# Patient Record
Sex: MALE | Race: WHITE | NOT HISPANIC OR LATINO
[De-identification: names, ages, dates, MRNs, and addresses within clinical notes are randomized per-mention and may not be internally consistent; named-entity substitution may affect disease eponyms.]

---

## 2018-12-17 ENCOUNTER — RECORDS - HEALTHEAST (OUTPATIENT)
Dept: ADMINISTRATIVE | Facility: OTHER | Age: 83
End: 2018-12-17

## 2019-02-18 ENCOUNTER — AMBULATORY - HEALTHEAST (OUTPATIENT)
Dept: GERIATRICS | Facility: CLINIC | Age: 84
End: 2019-02-18

## 2019-02-18 ENCOUNTER — OFFICE VISIT - HEALTHEAST (OUTPATIENT)
Dept: GERIATRICS | Facility: CLINIC | Age: 84
End: 2019-02-18

## 2019-02-18 DIAGNOSIS — Z93.59 SUPRAPUBIC CATHETER (H): ICD-10-CM

## 2019-02-18 DIAGNOSIS — I73.9 PAD (PERIPHERAL ARTERY DISEASE) (H): ICD-10-CM

## 2019-02-18 DIAGNOSIS — R29.6 FREQUENT FALLS: ICD-10-CM

## 2019-02-18 DIAGNOSIS — R33.8 BENIGN PROSTATIC HYPERPLASIA WITH URINARY RETENTION: ICD-10-CM

## 2019-02-18 DIAGNOSIS — R53.1 GENERALIZED WEAKNESS: ICD-10-CM

## 2019-02-18 DIAGNOSIS — N40.1 BENIGN PROSTATIC HYPERPLASIA WITH URINARY RETENTION: ICD-10-CM

## 2019-02-18 RX ORDER — ACETAMINOPHEN 500 MG
1000 TABLET ORAL DAILY
Status: SHIPPED | COMMUNITY
Start: 2019-02-18

## 2019-02-18 RX ORDER — MICONAZOLE NITRATE 20 MG/G
1 CREAM TOPICAL 2 TIMES DAILY
Status: SHIPPED | COMMUNITY
Start: 2019-02-18

## 2019-02-18 RX ORDER — MULTIVIT-MIN/IRON FUM/FOLIC AC 7.5 MG-4
1 TABLET ORAL DAILY
Status: SHIPPED | COMMUNITY
Start: 2019-02-18

## 2019-02-20 ENCOUNTER — COMMUNICATION - HEALTHEAST (OUTPATIENT)
Dept: GERIATRICS | Facility: CLINIC | Age: 84
End: 2019-02-20

## 2019-02-22 ENCOUNTER — COMMUNICATION - HEALTHEAST (OUTPATIENT)
Dept: GERIATRICS | Facility: CLINIC | Age: 84
End: 2019-02-22

## 2019-02-25 ENCOUNTER — OFFICE VISIT - HEALTHEAST (OUTPATIENT)
Dept: GERIATRICS | Facility: CLINIC | Age: 84
End: 2019-02-25

## 2019-02-25 DIAGNOSIS — N40.1 BENIGN PROSTATIC HYPERPLASIA WITH URINARY RETENTION: ICD-10-CM

## 2019-02-25 DIAGNOSIS — R33.8 BENIGN PROSTATIC HYPERPLASIA WITH URINARY RETENTION: ICD-10-CM

## 2019-02-25 DIAGNOSIS — Z93.59 SUPRAPUBIC CATHETER (H): ICD-10-CM

## 2019-02-25 DIAGNOSIS — R29.6 FREQUENT FALLS: ICD-10-CM

## 2019-02-25 DIAGNOSIS — R53.1 GENERALIZED WEAKNESS: ICD-10-CM

## 2019-02-28 ENCOUNTER — OFFICE VISIT - HEALTHEAST (OUTPATIENT)
Dept: GERIATRICS | Facility: CLINIC | Age: 84
End: 2019-02-28

## 2019-02-28 DIAGNOSIS — R33.8 BENIGN PROSTATIC HYPERPLASIA WITH URINARY RETENTION: ICD-10-CM

## 2019-02-28 DIAGNOSIS — N40.1 BENIGN PROSTATIC HYPERPLASIA WITH URINARY RETENTION: ICD-10-CM

## 2019-02-28 DIAGNOSIS — I73.9 PAD (PERIPHERAL ARTERY DISEASE) (H): ICD-10-CM

## 2019-02-28 DIAGNOSIS — R53.1 GENERALIZED WEAKNESS: ICD-10-CM

## 2019-02-28 DIAGNOSIS — R62.7 FTT (FAILURE TO THRIVE) IN ADULT: ICD-10-CM

## 2019-02-28 DIAGNOSIS — Z93.59 SUPRAPUBIC CATHETER (H): ICD-10-CM

## 2019-03-04 ENCOUNTER — OFFICE VISIT - HEALTHEAST (OUTPATIENT)
Dept: GERIATRICS | Facility: CLINIC | Age: 84
End: 2019-03-04

## 2019-03-04 DIAGNOSIS — R29.6 FREQUENT FALLS: ICD-10-CM

## 2019-03-04 DIAGNOSIS — N40.1 BENIGN PROSTATIC HYPERPLASIA WITH URINARY RETENTION: ICD-10-CM

## 2019-03-04 DIAGNOSIS — Z93.59 SUPRAPUBIC CATHETER (H): ICD-10-CM

## 2019-03-04 DIAGNOSIS — R53.1 GENERALIZED WEAKNESS: ICD-10-CM

## 2019-03-04 DIAGNOSIS — R33.8 BENIGN PROSTATIC HYPERPLASIA WITH URINARY RETENTION: ICD-10-CM

## 2019-03-07 ENCOUNTER — OFFICE VISIT - HEALTHEAST (OUTPATIENT)
Dept: GERIATRICS | Facility: CLINIC | Age: 84
End: 2019-03-07

## 2019-03-07 DIAGNOSIS — N40.1 BENIGN PROSTATIC HYPERPLASIA WITH URINARY RETENTION: ICD-10-CM

## 2019-03-07 DIAGNOSIS — R53.1 GENERALIZED WEAKNESS: ICD-10-CM

## 2019-03-07 DIAGNOSIS — R62.7 FTT (FAILURE TO THRIVE) IN ADULT: ICD-10-CM

## 2019-03-07 DIAGNOSIS — Z93.59 SUPRAPUBIC CATHETER (H): ICD-10-CM

## 2019-03-07 DIAGNOSIS — R33.8 BENIGN PROSTATIC HYPERPLASIA WITH URINARY RETENTION: ICD-10-CM

## 2019-03-11 ENCOUNTER — OFFICE VISIT - HEALTHEAST (OUTPATIENT)
Dept: GERIATRICS | Facility: CLINIC | Age: 84
End: 2019-03-11

## 2019-03-11 DIAGNOSIS — Z93.59 SUPRAPUBIC CATHETER (H): ICD-10-CM

## 2019-03-11 DIAGNOSIS — R62.7 FTT (FAILURE TO THRIVE) IN ADULT: ICD-10-CM

## 2019-03-11 DIAGNOSIS — R33.8 BENIGN PROSTATIC HYPERPLASIA WITH URINARY RETENTION: ICD-10-CM

## 2019-03-11 DIAGNOSIS — N40.1 BENIGN PROSTATIC HYPERPLASIA WITH URINARY RETENTION: ICD-10-CM

## 2019-03-11 DIAGNOSIS — R53.1 GENERALIZED WEAKNESS: ICD-10-CM

## 2019-03-11 DIAGNOSIS — R29.6 FREQUENT FALLS: ICD-10-CM

## 2019-03-14 ENCOUNTER — OFFICE VISIT - HEALTHEAST (OUTPATIENT)
Dept: GERIATRICS | Facility: CLINIC | Age: 84
End: 2019-03-14

## 2019-03-14 DIAGNOSIS — R52 PAIN MANAGEMENT: ICD-10-CM

## 2019-03-14 DIAGNOSIS — Z93.59 SUPRAPUBIC CATHETER (H): ICD-10-CM

## 2019-03-14 DIAGNOSIS — R53.1 GENERALIZED WEAKNESS: ICD-10-CM

## 2019-03-14 DIAGNOSIS — N40.1 BENIGN PROSTATIC HYPERPLASIA WITH URINARY RETENTION: ICD-10-CM

## 2019-03-14 DIAGNOSIS — R33.8 BENIGN PROSTATIC HYPERPLASIA WITH URINARY RETENTION: ICD-10-CM

## 2019-03-18 ENCOUNTER — OFFICE VISIT - HEALTHEAST (OUTPATIENT)
Dept: GERIATRICS | Facility: CLINIC | Age: 84
End: 2019-03-18

## 2019-03-18 DIAGNOSIS — R33.8 BENIGN PROSTATIC HYPERPLASIA WITH URINARY RETENTION: ICD-10-CM

## 2019-03-18 DIAGNOSIS — R53.1 GENERALIZED WEAKNESS: ICD-10-CM

## 2019-03-18 DIAGNOSIS — Z93.59 SUPRAPUBIC CATHETER (H): ICD-10-CM

## 2019-03-18 DIAGNOSIS — R29.6 FREQUENT FALLS: ICD-10-CM

## 2019-03-18 DIAGNOSIS — N40.1 BENIGN PROSTATIC HYPERPLASIA WITH URINARY RETENTION: ICD-10-CM

## 2019-03-21 ENCOUNTER — OFFICE VISIT - HEALTHEAST (OUTPATIENT)
Dept: GERIATRICS | Facility: CLINIC | Age: 84
End: 2019-03-21

## 2019-03-21 DIAGNOSIS — R29.6 FREQUENT FALLS: ICD-10-CM

## 2019-03-21 DIAGNOSIS — R53.1 GENERALIZED WEAKNESS: ICD-10-CM

## 2019-03-21 DIAGNOSIS — Z93.59 SUPRAPUBIC CATHETER (H): ICD-10-CM

## 2019-03-21 DIAGNOSIS — R33.8 BENIGN PROSTATIC HYPERPLASIA WITH URINARY RETENTION: ICD-10-CM

## 2019-03-21 DIAGNOSIS — N40.1 BENIGN PROSTATIC HYPERPLASIA WITH URINARY RETENTION: ICD-10-CM

## 2019-03-25 ENCOUNTER — OFFICE VISIT - HEALTHEAST (OUTPATIENT)
Dept: GERIATRICS | Facility: CLINIC | Age: 84
End: 2019-03-25

## 2019-03-25 DIAGNOSIS — Z93.59 SUPRAPUBIC CATHETER (H): ICD-10-CM

## 2019-03-25 DIAGNOSIS — R53.1 GENERALIZED WEAKNESS: ICD-10-CM

## 2019-03-25 DIAGNOSIS — R29.6 FREQUENT FALLS: ICD-10-CM

## 2019-03-25 DIAGNOSIS — N40.1 BENIGN PROSTATIC HYPERPLASIA WITH URINARY RETENTION: ICD-10-CM

## 2019-03-25 DIAGNOSIS — R33.8 BENIGN PROSTATIC HYPERPLASIA WITH URINARY RETENTION: ICD-10-CM

## 2019-03-28 ENCOUNTER — OFFICE VISIT - HEALTHEAST (OUTPATIENT)
Dept: GERIATRICS | Facility: CLINIC | Age: 84
End: 2019-03-28

## 2019-03-28 DIAGNOSIS — Z93.59 SUPRAPUBIC CATHETER (H): ICD-10-CM

## 2019-03-28 DIAGNOSIS — R60.0 BILATERAL LOWER EXTREMITY EDEMA: ICD-10-CM

## 2019-03-28 DIAGNOSIS — R33.9 URINARY RETENTION: ICD-10-CM

## 2019-03-28 DIAGNOSIS — N40.1 BENIGN PROSTATIC HYPERPLASIA WITH URINARY RETENTION: ICD-10-CM

## 2019-03-28 DIAGNOSIS — R33.8 BENIGN PROSTATIC HYPERPLASIA WITH URINARY RETENTION: ICD-10-CM

## 2019-04-04 ENCOUNTER — OFFICE VISIT - HEALTHEAST (OUTPATIENT)
Dept: GERIATRICS | Facility: CLINIC | Age: 84
End: 2019-04-04

## 2019-04-04 DIAGNOSIS — R33.8 BENIGN PROSTATIC HYPERPLASIA WITH URINARY RETENTION: ICD-10-CM

## 2019-04-04 DIAGNOSIS — R33.9 URINARY RETENTION: ICD-10-CM

## 2019-04-04 DIAGNOSIS — I73.9 PAD (PERIPHERAL ARTERY DISEASE) (H): ICD-10-CM

## 2019-04-04 DIAGNOSIS — R53.1 GENERALIZED WEAKNESS: ICD-10-CM

## 2019-04-04 DIAGNOSIS — N40.1 BENIGN PROSTATIC HYPERPLASIA WITH URINARY RETENTION: ICD-10-CM

## 2019-04-08 ENCOUNTER — OFFICE VISIT - HEALTHEAST (OUTPATIENT)
Dept: GERIATRICS | Facility: CLINIC | Age: 84
End: 2019-04-08

## 2019-04-08 DIAGNOSIS — I73.9 PAD (PERIPHERAL ARTERY DISEASE) (H): ICD-10-CM

## 2019-04-08 DIAGNOSIS — R33.9 URINARY RETENTION: ICD-10-CM

## 2019-04-08 DIAGNOSIS — N40.1 BENIGN PROSTATIC HYPERPLASIA WITH URINARY RETENTION: ICD-10-CM

## 2019-04-08 DIAGNOSIS — R53.1 GENERALIZED WEAKNESS: ICD-10-CM

## 2019-04-08 DIAGNOSIS — R33.8 BENIGN PROSTATIC HYPERPLASIA WITH URINARY RETENTION: ICD-10-CM

## 2019-04-08 DIAGNOSIS — Z93.59 SUPRAPUBIC CATHETER (H): ICD-10-CM

## 2019-04-11 ENCOUNTER — OFFICE VISIT - HEALTHEAST (OUTPATIENT)
Dept: GERIATRICS | Facility: CLINIC | Age: 84
End: 2019-04-11

## 2019-04-11 DIAGNOSIS — R33.8 BENIGN PROSTATIC HYPERPLASIA WITH URINARY RETENTION: ICD-10-CM

## 2019-04-11 DIAGNOSIS — R33.9 URINARY RETENTION: ICD-10-CM

## 2019-04-11 DIAGNOSIS — I73.9 PAD (PERIPHERAL ARTERY DISEASE) (H): ICD-10-CM

## 2019-04-11 DIAGNOSIS — N40.1 BENIGN PROSTATIC HYPERPLASIA WITH URINARY RETENTION: ICD-10-CM

## 2019-04-11 DIAGNOSIS — Z93.59 SUPRAPUBIC CATHETER (H): ICD-10-CM

## 2019-04-11 DIAGNOSIS — R53.1 GENERALIZED WEAKNESS: ICD-10-CM

## 2019-04-11 DIAGNOSIS — R60.0 BILATERAL LOWER EXTREMITY EDEMA: ICD-10-CM

## 2019-04-11 DIAGNOSIS — R29.6 FREQUENT FALLS: ICD-10-CM

## 2019-04-15 RX ORDER — MUPIROCIN 20 MG/G
1 OINTMENT TOPICAL 3 TIMES DAILY
Status: SHIPPED | COMMUNITY
Start: 2019-04-15

## 2019-04-16 ENCOUNTER — AMBULATORY - HEALTHEAST (OUTPATIENT)
Dept: GERIATRICS | Facility: CLINIC | Age: 84
End: 2019-04-16

## 2021-05-27 NOTE — PROGRESS NOTES
[facility]    Facility:   [facility] [[id number]]   Code Status: DNR/DNI      CHIEF COMPLAINT/REASON FOR VISIT:  Chief Complaint   Patient presents with     Review Of Multiple Medical Conditions       HISTORY:      HPI: Ronnie is a 89 y.o. male who was admitted to the hospital due to frequent falls and failure to thrive.  He had progressive weakness and now required wheelchair.  He had BPH with urinary retention requiring chronic indwelling Bar catheter.  While he was hospitalized it was recommended that he have a suprapubic catheter placed since there was erosion of the penis from the indwelling Bar catheter.  This procedure was done.  It was determined that he was at risk for aspiration pneumonia and restrictions of diet were imposed.  At the same time it was determined that he is at risk for dehydration and he required IV fluids.  However he became fluid overloaded.  It was at this point that discussion of hospice consultation was done and this occurred, but there were signs of improvement of his condition with some clearing of confusion with the treatment of the UTI and appearance of some improvement of overall condition, such that his daughter wanted to try transitional care unit for strengthening and potential for long-term care placement knowing that hospice is always an option in the future.    Upon current review of systems he is doing well.  He states he is having no new medical problems.  He is not having fevers or chills.  He does not have cough or shortness of breath.  Does not have chest pain or palpitations of the heart.  He does not have abdominal pain or nausea.  He is not having problems with the suprapubic catheter.      Past Medical History:   Diagnosis Date     Arthropathy, multiple sites      Benign non-nodular prostatic hyperplasia without lower urinary tract symptoms      Chronic indwelling Bar catheter      Depressive disorder      Dysphagia       FTT (failure to thrive) in adult      Gout      Hearing loss      Onychomycosis      PAD (peripheral artery disease) (H)      Penile erosion     d/t rivera cath             Family History   Problem Relation Age of Onset     Stroke Mother      Lung cancer Sister      Stroke Brother      Brain cancer Sister      Social History     Socioeconomic History     Marital status:      Spouse name: Not on file     Number of children: Not on file     Years of education: Not on file     Highest education level: Not on file   Occupational History     Not on file   Social Needs     Financial resource strain: Not on file     Food insecurity:     Worry: Not on file     Inability: Not on file     Transportation needs:     Medical: Not on file     Non-medical: Not on file   Tobacco Use     Smoking status: Former Smoker     Packs/day: 1.00     Years: 10.00     Pack years: 10.00     Types: Cigarettes     Last attempt to quit: 5/10/1963     Years since quittin.9     Smokeless tobacco: Never Used   Substance and Sexual Activity     Alcohol use: Yes     Alcohol/week: 4.2 oz     Types: 7 Cans of beer per week     Comment: 1 beer per day     Drug use: Not on file     Sexual activity: Not on file   Lifestyle     Physical activity:     Days per week: Not on file     Minutes per session: Not on file     Stress: Not on file   Relationships     Social connections:     Talks on phone: Not on file     Gets together: Not on file     Attends [employer] service: Not on file     Active member of club or organization: Not on file     Attends meetings of clubs or organizations: Not on file     Relationship status: Not on file     Intimate partner violence:     Fear of current or ex partner: Not on file     Emotionally abused: Not on file     Physically abused: Not on file     Forced sexual activity: Not on file   Other Topics Concern     Not on file   Social History Narrative     Not on file         Review of Systems    .There were no vitals  filed for this visit.    Physical Exam   Constitutional: No distress.   HENT:   Nose: Nose normal.   Eyes: Right eye exhibits no discharge. Left eye exhibits no discharge.   Neck: No JVD present.   Cardiovascular: Normal heart sounds.   Pulmonary/Chest: Breath sounds normal. No respiratory distress.   Abdominal: Soft. Bowel sounds are normal. He exhibits no distension. There is no tenderness.   Neurological: He is alert.   Psychiatric: He has a normal mood and affect.   Nursing note and vitals reviewed.        LABS:   No new laboratory testing    ASSESSMENT:      ICD-10-CM    1. Benign prostatic hyperplasia with urinary retention N40.1     R33.8    2. Urinary retention R33.9    3. Generalized weakness R53.1    4. PAD (peripheral artery disease) (H) I73.9    5. Suprapubic catheter (H) Z93.59        PLAN:    Continue monitoring medical conditions.  Continue with therapies as able.    Electronically signed by: Bayron Hall MD

## 2021-05-27 NOTE — PROGRESS NOTES
[facility]    NAME:  Ronnie Stein             :  1924  MRN: [id number]  CODE STATUS:  DNR/DNI    VISIT TYPE: DISCHARGE SUMMARY  FACILYTY: [facility] [[id number]]                    PRIMARY CARE PROVIDER: Matthieu Finley MD    DISCHARGE DIAGNOSIS:      1. Frequent falls    2. Generalized weakness    3. Urinary retention    4. Bilateral lower extremity edema    5. PAD (peripheral artery disease) (H)    6. Benign prostatic hyperplasia with urinary retention    7. Suprapubic catheter (H)         DISCHARGE MEDICATIONS:      Current Outpatient Medications   Medication Sig Dispense Refill     acetaminophen (TYLENOL) 500 MG tablet Take 1,000 mg by mouth daily. 1h prior to dressing changes, and two times a day prn at least 4h between doses             miconazole (MICOTIN) 2 % cream Apply 1 application topically 2 (two) times a day. Apply to penile erosion       multivitamin with minerals tablet Take 1 tablet by mouth daily.       mupirocin (BACTROBAN) 2 % ointment Apply 1 application topically 3 (three) times a day.       No current facility-administered medications for this visit.        HISTORY OF PRESENT ILLNESS: Ronnie Stein is a 89 y.o. male with Peripheral artery disease and Chronic indwelling rivera catheter, history of recurrent falls, wheelchair bound since admission in 2018 who was admitted on 2019 for failure to thrive, dehydration/malnutrition and recurrent falls. He presented to ER for evaluation following a fall and dizziness. No LOC.     On admission, he was noted to have multiple wounds feet, elbow and failure to thrive with prealbumin 7. Speech therapist following, concerned for aspiration. Now on dysphagia diet which puts him at higher risk of dehydration. He was treated with IVF on admission for dehydration but developed hypoxemia due to volume overload. IVF stopped. Dr. Tam discussed with family that hospice might be a better option and  hospice consulted. Hospice Liaison met with patient and daughter on 2/12 and felt he was not hospice appropriate at the time. Patient at high risk for aspiration and also at very high risk for dehydration and acute kidney injury given poor oral intake. He continues to have poor oral intake and losing weight since admission. Yesterday(2/14), Hospice Liaison reviewed patient's chart and they now agree that he is appropriate for hospice. Discussed with patient's daughter 2/14 who thinks patient is clinically doing little better and his confusion resolved. She would like to continue current management for now and is not ready for hospice or comfort care at this time. Patient had been little confused early this week which was likely due to UTI. Currently on antibiotic for UTI, his confusion much improved and daughter would like to see if he can get stronger again. She would like to pursue TCU or long term placement at this time but knows that hospice is an option if he fails or worsening.  Repeat CXR 2/14/19 showed improved finding. He continues to require some supplemental oxygen mostly with sleeping.       Patient was also seen by Urology for rivera related penile erosion, recommended suprapubic tube placement which was placed on 2/12/19 by IR. He was noted to have E. Coli UTI now on oral antibiotic.       ASSESSMENT and PLAN      Principal Problem:  Recurrent Falls  Failure to thrive   Dysphagia     Patient with history of recurrent falls, admitted with fall and dizziness. Suspected etiology include dehydration, malnutrition.     Continue therapies.     Continue dysphagia diet per speech therapies. Aspiration precautions    High risk for dehydration and acute kidney injury.     Ensure supplement (he does not like the taste)    Discussed with nursing to assist patient at all times with feeding. Please keep recording his oral intake and encourage PO intake    Wound care following for multiple wounds    Hospice liaison  met with patient and daughter 2/12/2019 - felt not hospice appropriate at the time. Now with poor oral intake and weight loss since then, he is hospice appropriate per hospice liaison. Discussed with daughter yesterday (2/14/19) who is not ready for hospice or comfort care at this time. But she understands that it is an option if he fails and/or they change their mind.   -Consider palliative care consult     Penile erosion     Chronic indwelling Rivera catheter  E. 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 Will need to be changed every 4 wks    Continue Keflex to complete the course.   Active Problems:  Cough, hypoxemia: fluid overload on CXR 2/12. Also at high risk for aspiration    IVF stopped 2/12 given CXR findings with pulmonary venous congestion. He was weaned off oxygen, but now back on 2L oxygen overnight 2/13-2/14.     Obtain CXR today.     Oxygen as needed.     Aspiration precautions    Bronchodilator as needed.     If worsened, consider CT chest for further evaluation   Benign non-nodular prostatic hyperplasia without lower urinary tract symptoms    On Flomax    Urology following, appreciated    ACP (advance care planning)    DNR.     Consider hospice as outpatient  Patient was stabilized and transferred to TCU for continued rehabilitation.    SKILLED NURSING FACILITY COURSE:  During this TCU stay, patient completed all anticipated goals of therapy.  He continues to have generalized weakness and will discharge to an [facility] with PT/OT.  He has BPH with urinary retention and has a suprapubic catheter.  He is followed by Urology. Pain is managed with Tylenol    PHYSICAL EXAMINATION:    Vitals:    04/15/19 2251   BP: 95/53   Pulse: 83   Resp: 16   Temp: 97.1  F (36.2  C)   SpO2: 90%   Weight: 148 lb 12.8 oz (67.5  kg)       GENERAL: Awake, Alert, oriented x3, not in any form of acute distress, answers questions appropriately, follows simple commands, conversant  HEENT: Head is normocephalic with normal hair distribution. No evidence of trauma. Ears: No acute purulent discharge. Eyes: Conjunctivae pink with no scleral jaundice. Nose: Normal mucosa and septum. NECK: Supple with no cervical or supraclavicular lymphadenopathy. Trachea is midline.   CHEST: No tenderness or deformity, no crepitus  LUNG: Clear to auscultation with good chest expansion. There are no crackles or wheezes, normal AP diameter.  BACK: No kyphosis of the thoracic spine. Symmetric, no curvature, ROM normal, no CVA tenderness, no spinal tenderness   CVS: There is good S1  S2, there are no murmurs, rubs, gallops, or heaves, rhythm is regular,  2+ pulses symmetric in all extremities.  ABDOMEN: Globular and soft, nontender to palpation, non distended, no masses, no organomegaly, good bowel sounds, no rebound or guarding, no peritoneal signs.   EXTREMITIES: Decreased  range of motion on both upper and lower extremities, there is no tenderness to palpation, no pedal edema, no cyanosis or clubbing, no calf tenderness.  Pulses equal in all extremities, normal cap refill, no joint swelling.  SKIN: Warm and dry, no erythema noted.  Skin color, texture, no rashes or lesions.  NEUROLOGICAL: The patient is oriented to person, place and time. Strength and sensation are grossly intact. Face is symmetric.    LABS:  All labs reviewed in the nursing home record.    DISCHARGE PLAN:  I certify that this patient is under my care and that I or the nurse practitioner working with me, had a face-to-face encounter that meets the physician face-to-face encounter requirements with this patient.   Date of Face-to-Face Encounter: 4/11/19    This patient is homebound because: The patient cannot leave home without  considerable and taxing effort.  Patient has FTT, generalized weakness,  and Urinary retention and requires the aid of a wheelchair and needs the assistance of another person,     I certify that, based on my findings, the following services are medically necessary home health services: PT, OT, and RN    My clinical findings support the need for the above skilled services because:   Patient to be followed by home care for physical therapy to eval and treat for strengthening, balance, endurance, and safety with mobility, and ambulation.  Patient to be followed by home care for occupational therapy to eval and treat for strengthening, ADL needs, adaptive equipment, and safety.  Patient to be followed by home care for nursing services for medication set up and teaching, symptom and disease processes monitoring and education.      The patient is, or has been, under my care and I have initiated the establishment of the plan of care. This patient will be followed by a physician who will periodically review the plan of care.  Planned discharge.  All therapy goals have been met.  Family will assist with discharge and transportation.  Patient will follow up with PCP within 7-10 days after discharge for medication mangagment and appropriate lab studies.          Electronically signed by:  John Oneil CNP      For documentation purposes, chart review, medication management, and discharge coordination of care was greater than 35 minutes

## 2021-05-27 NOTE — PROGRESS NOTES
[facility]    NAME:  Ronnie Stein             :  1924  MRN: [id number]  CODE STATUS:  DNR/DNI    FACILITY:  [facility] [[id number]]       ROOM:   814    CHIEF COMPLAINT/REASON FOR VISIT:  Chief Complaint   Patient presents with     Problem Visit     Generalized weakness     HISTORY OF PRESENT ILLNESS: Ronnie Stein is a 89 y.o. male with Peripheral artery disease and Chronic indwelling rivera catheter, history of recurrent falls, wheelchair bound since admission in 2018 who was admitted on 2019 for failure to thrive, dehydration/malnutrition and recurrent falls. He presented to ER for evaluation following a fall and dizziness. No LOC.     On admission, he was noted to have multiple wounds feet, elbow and failure to thrive with prealbumin 7. Speech therapist following, concerned for aspiration. Now on dysphagia diet which puts him at higher risk of dehydration. He was treated with IVF on admission for dehydration but developed hypoxemia due to volume overload. IVF stopped. Dr. Tam discussed with family that hospice might be a better option and hospice consulted. Hospice Liaison met with patient and daughter on  and felt he was not hospice appropriate at the time. Patient at high risk for aspiration and also at very high risk for dehydration and acute kidney injury given poor oral intake. He continues to have poor oral intake and losing weight since admission. Yesterday(), Hospice Liaison reviewed patient's chart and they now agree that he is appropriate for hospice. Discussed with patient's daughter  who thinks patient is clinically doing little better and his confusion resolved. She would like to continue current management for now and is not ready for hospice or comfort care at this time. Patient had been little confused early this week which was likely due to UTI. Currently on antibiotic for UTI, his confusion much improved and daughter  would like to see if he can get stronger again. She would like to pursue TCU or long term placement at this time but knows that hospice is an option if he fails or worsening.  Repeat CXR 2/14/19 showed improved finding. He continues to require some supplemental oxygen mostly with sleeping.       Patient was also seen by Urology for rivera related penile erosion, recommended suprapubic tube placement which was placed on 2/12/19 by IR. He was noted to have E. Coli UTI now on oral antibiotic.       ASSESSMENT and PLAN      Principal Problem:  Recurrent Falls  Failure to thrive   Dysphagia     Patient with history of recurrent falls, admitted with fall and dizziness. Suspected etiology include dehydration, malnutrition.     Continue therapies.     Continue dysphagia diet per speech therapies. Aspiration precautions    High risk for dehydration and acute kidney injury.     Ensure supplement (he does not like the taste)    Discussed with nursing to assist patient at all times with feeding. Please keep recording his oral intake and encourage PO intake    Wound care following for multiple wounds    Hospice liaison met with patient and daughter 2/12/2019 - felt not hospice appropriate at the time. Now with poor oral intake and weight loss since then, he is hospice appropriate per hospice liaison. Discussed with daughter yesterday (2/14/19) who is not ready for hospice or comfort care at this time. But she understands that it is an option if he fails and/or they change their mind.   -Consider palliative care consult     Penile erosion     Chronic indwelling Rivera catheter  E. 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 Will need to be changed every 4 wks    Continue Keflex to complete the course.   Active Problems:  Cough,  hypoxemia: fluid overload on CXR 2/12. Also at high risk for aspiration    IVF stopped 2/12 given CXR findings with pulmonary venous congestion. He was weaned off oxygen, but now back on 2L oxygen overnight 2/13-2/14.     Obtain CXR today.     Oxygen as needed.     Aspiration precautions    Bronchodilator as needed.     If worsened, consider CT chest for further evaluation   Benign non-nodular prostatic hyperplasia without lower urinary tract symptoms    On Flomax    Urology following, appreciated    ACP (advance care planning)    DNR.     Consider hospice as outpatient  Patient was stabilized and transferred to TCU for continued rehabilitation.    Today, patient is seen at the bedside.  He has bilateral lower extremity edema.  He has urinary retention with a suprapubic catheter. Followed by Urology.        Past Medical History:   Diagnosis Date     Arthropathy, multiple sites      Benign non-nodular prostatic hyperplasia without lower urinary tract symptoms      Chronic indwelling Rivera catheter      Depressive disorder      Dysphagia      FTT (failure to thrive) in adult      Gout      Hearing loss      Onychomycosis      PAD (peripheral artery disease) (H)      Penile erosion     d/t rivera cath     Past Surgical History:   Procedure Laterality Date     CERVICAL LAMINECTOMY       LUMBAR LAMINECTOMY       [place] REMOVE LENS MATERIAL PHACOFRAGMT  6/1/06, 6/15/06     Family History   Problem Relation Age of Onset     Stroke Mother      Lung cancer Sister      Stroke Brother      Brain cancer Sister      Social History     Socioeconomic History     Marital status:      Spouse name: Not on file     Number of children: Not on file     Years of education: Not on file     Highest education level: Not on file   Occupational History     Not on file   Social Needs     Financial resource strain: Not on file     Food insecurity:     Worry: Not on file     Inability: Not on file     Transportation needs:     Medical: Not on  file     Non-medical: Not on file   Tobacco Use     Smoking status: Former Smoker     Packs/day: 1.00     Years: 10.00     Pack years: 10.00     Types: Cigarettes     Last attempt to quit: 5/10/1963     Years since quittin.9     Smokeless tobacco: Never Used   Substance and Sexual Activity     Alcohol use: Yes     Alcohol/week: 4.2 oz     Types: 7 Cans of beer per week     Comment: 1 beer per day     Drug use: Not on file     Sexual activity: Not on file   Lifestyle     Physical activity:     Days per week: Not on file     Minutes per session: Not on file     Stress: Not on file   Relationships     Social connections:     Talks on phone: Not on file     Gets together: Not on file     Attends Hoahaoism service: Not on file     Active member of club or organization: Not on file     Attends meetings of clubs or organizations: Not on file     Relationship status: Not on file     Intimate partner violence:     Fear of current or ex partner: Not on file     Emotionally abused: Not on file     Physically abused: Not on file     Forced sexual activity: Not on file   Other Topics Concern     Not on file   Social History Narrative     Not on file     No Known Allergies     Current Outpatient Medications   Medication Sig Dispense Refill     acetaminophen (TYLENOL) 500 MG tablet Take 1,000 mg by mouth daily. 1h prior to dressing changes, and two times a day prn at least 4h between doses             miconazole (MICOTIN) 2 % cream Apply 1 application topically 2 (two) times a day. Apply to penile erosion       multivitamin with minerals tablet Take 1 tablet by mouth daily.       tamsulosin (FLOMAX) 0.4 mg cap Take 0.4 mg by mouth Daily after breakfast.       No current facility-administered medications for this visit.      REVIEW OF SYSTEMS:    Currently, no fever, chills, or rigors. Does not have any visual or hearing problems. Denies any chest pain, headaches, palpitations, lightheadedness, dizziness, shortness of breath,  or cough. Appetite is good. Denies any GERD symptoms. Denies any difficulty with swallowing, nausea, or vomiting.  Denies any abdominal pain, diarrhea or constipation. Urinary retention with suprapubic catheter. No insomnia. No active bleeding. No rash.     PHYSICAL EXAMINATION:  Vitals:    04/08/19 2247   BP: 110/52   Pulse: 89   Resp: 17   Temp: (!) 96.2  F (35.7  C)   SpO2: 95%   Weight: 143 lb 12.8 oz (65.2 kg)       GENERAL: Awake, Alert, oriented x3, not in any form of acute distress, answers questions appropriately, follows simple commands, conversant  HEENT: Head is normocephalic with normal hair distribution. No evidence of trauma. Ears: No acute purulent discharge. Eyes: Conjunctivae pink with no scleral jaundice. Nose: Normal mucosa and septum. NECK: Supple with no cervical or supraclavicular lymphadenopathy. Trachea is midline.   CHEST: No tenderness or deformity, no crepitus  LUNG: Clear to auscultation with good chest expansion. There are no crackles or wheezes, normal AP diameter.  BACK: No kyphosis of the thoracic spine. Symmetric, no curvature, ROM normal, no CVA tenderness, no spinal tenderness   CVS: There is good S1  S2, there are no murmurs, rubs, gallops, or heaves, rhythm is regular,  2+ pulses symmetric in all extremities.  ABDOMEN: Globular and soft, nontender to palpation, non distended, no masses, no organomegaly, good bowel sounds, no rebound or guarding, no peritoneal signs.   EXTREMITIES:  Decreased range of motion on both upper and lower extremities, there is no tenderness to palpation, bilateral lower extremity edema, no cyanosis or clubbing, no calf tenderness.  Pulses equal in all extremities, normal cap refill, no joint swelling.  SKIN: Warm and dry, no erythema noted.  Skin color, texture, no rashes or lesions.  NEUROLOGICAL: The patient is oriented to person, place and time. Strength and sensation are grossly intact. Face is symmetric.    LABS:  All labs reviewed in the nursing  home record.    ASSESSMENT/PLAN:    1. Generalized weakness - Continue PT/OT   2. Benign prostatic hyperplasia with urinary retention  - Continue Flomax   3. PAD (peripheral artery disease) (H) - Stable   4. Urinary retention - Continue Flomax and suprapubic catheter.  Followed by Urology                             Electronically signed by:  John Oneil CNP

## 2021-05-27 NOTE — PROGRESS NOTES
[facility]    NAME:  Ronnie Stein             :  1924  MRN: [id number]  CODE STATUS:  DNR/DNI    FACILITY:  [facility] [[id number]]       ROOM:   814    CHIEF COMPLAINT/REASON FOR VISIT:  Chief Complaint   Patient presents with     Problem Visit     Bilateral lower extremity edema and Urinary retention     HISTORY OF PRESENT ILLNESS: Ronnie Stein is a 89 y.o. male with Peripheral artery disease and Chronic indwelling rivera catheter, history of recurrent falls, wheelchair bound since admission in 2018 who was admitted on 2019 for failure to thrive, dehydration/malnutrition and recurrent falls. He presented to ER for evaluation following a fall and dizziness. No LOC.     On admission, he was noted to have multiple wounds feet, elbow and failure to thrive with prealbumin 7. Speech therapist following, concerned for aspiration. Now on dysphagia diet which puts him at higher risk of dehydration. He was treated with IVF on admission for dehydration but developed hypoxemia due to volume overload. IVF stopped. Dr. Tam discussed with family that hospice might be a better option and hospice consulted. Hospice Liaison met with patient and daughter on  and felt he was not hospice appropriate at the time. Patient at high risk for aspiration and also at very high risk for dehydration and acute kidney injury given poor oral intake. He continues to have poor oral intake and losing weight since admission. Yesterday(), Hospice Liaison reviewed patient's chart and they now agree that he is appropriate for hospice. Discussed with patient's daughter  who thinks patient is clinically doing little better and his confusion resolved. She would like to continue current management for now and is not ready for hospice or comfort care at this time. Patient had been little confused early this week which was likely due to UTI. Currently on antibiotic for UTI, his  confusion much improved and daughter would like to see if he can get stronger again. She would like to pursue TCU or long term placement at this time but knows that hospice is an option if he fails or worsening.  Repeat CXR 2/14/19 showed improved finding. He continues to require some supplemental oxygen mostly with sleeping.       Patient was also seen by Urology for rivera related penile erosion, recommended suprapubic tube placement which was placed on 2/12/19 by IR. He was noted to have E. Coli UTI now on oral antibiotic.       ASSESSMENT and PLAN      Principal Problem:  Recurrent Falls  Failure to thrive   Dysphagia     Patient with history of recurrent falls, admitted with fall and dizziness. Suspected etiology include dehydration, malnutrition.     Continue therapies.     Continue dysphagia diet per speech therapies. Aspiration precautions    High risk for dehydration and acute kidney injury.     Ensure supplement (he does not like the taste)    Discussed with nursing to assist patient at all times with feeding. Please keep recording his oral intake and encourage PO intake    Wound care following for multiple wounds    Hospice liaison met with patient and daughter 2/12/2019 - felt not hospice appropriate at the time. Now with poor oral intake and weight loss since then, he is hospice appropriate per hospice liaison. Discussed with daughter yesterday (2/14/19) who is not ready for hospice or comfort care at this time. But she understands that it is an option if he fails and/or they change their mind.   -Consider palliative care consult     Penile erosion     Chronic indwelling Rivera catheter  E. 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 Will need to be changed every 4 wks    Continue Keflex to complete the  course.   Active Problems:  Cough, hypoxemia: fluid overload on CXR 2/12. Also at high risk for aspiration    IVF stopped 2/12 given CXR findings with pulmonary venous congestion. He was weaned off oxygen, but now back on 2L oxygen overnight 2/13-2/14.     Obtain CXR today.     Oxygen as needed.     Aspiration precautions    Bronchodilator as needed.     If worsened, consider CT chest for further evaluation   Benign non-nodular prostatic hyperplasia without lower urinary tract symptoms    On Flomax    Urology following, appreciated    ACP (advance care planning)    DNR.     Consider hospice as outpatient  Patient was stabilized and transferred to TCU for continued rehabilitation.    Today, patient is seen at the bedside.  He has bilateral lower extremity edema.  He has urinary retention with a suprapubic catheter. Followed by Urology.        Past Medical History:   Diagnosis Date     Arthropathy, multiple sites      Benign non-nodular prostatic hyperplasia without lower urinary tract symptoms      Chronic indwelling Rivera catheter      Depressive disorder      Dysphagia      FTT (failure to thrive) in adult      Gout      Hearing loss      Onychomycosis      PAD (peripheral artery disease) (H)      Penile erosion     d/t rivera cath     Past Surgical History:   Procedure Laterality Date     CERVICAL LAMINECTOMY       LUMBAR LAMINECTOMY       [place] REMOVE LENS MATERIAL PHACOFRAGMT  6/1/06, 6/15/06     Family History   Problem Relation Age of Onset     Stroke Mother      Lung cancer Sister      Stroke Brother      Brain cancer Sister      Social History     Socioeconomic History     Marital status:      Spouse name: Not on file     Number of children: Not on file     Years of education: Not on file     Highest education level: Not on file   Occupational History     Not on file   Social Needs     Financial resource strain: Not on file     Food insecurity:     Worry: Not on file     Inability: Not on file      Transportation needs:     Medical: Not on file     Non-medical: Not on file   Tobacco Use     Smoking status: Former Smoker     Packs/day: 1.00     Years: 10.00     Pack years: 10.00     Types: Cigarettes     Last attempt to quit: 5/10/1963     Years since quittin.9     Smokeless tobacco: Never Used   Substance and Sexual Activity     Alcohol use: Yes     Alcohol/week: 4.2 oz     Types: 7 Cans of beer per week     Comment: 1 beer per day     Drug use: Not on file     Sexual activity: Not on file   Lifestyle     Physical activity:     Days per week: Not on file     Minutes per session: Not on file     Stress: Not on file   Relationships     Social connections:     Talks on phone: Not on file     Gets together: Not on file     Attends Congregation service: Not on file     Active member of club or organization: Not on file     Attends meetings of clubs or organizations: Not on file     Relationship status: Not on file     Intimate partner violence:     Fear of current or ex partner: Not on file     Emotionally abused: Not on file     Physically abused: Not on file     Forced sexual activity: Not on file   Other Topics Concern     Not on file   Social History Narrative     Not on file     No Known Allergies     Current Outpatient Medications   Medication Sig Dispense Refill     acetaminophen (TYLENOL) 500 MG tablet Take 1,000 mg by mouth daily. 1h prior to dressing changes, and two times a day prn at least 4h between doses             miconazole (MICOTIN) 2 % cream Apply 1 application topically 2 (two) times a day. Apply to penile erosion       multivitamin with minerals tablet Take 1 tablet by mouth daily.       tamsulosin (FLOMAX) 0.4 mg cap Take 0.4 mg by mouth Daily after breakfast.       No current facility-administered medications for this visit.      REVIEW OF SYSTEMS:    Currently, no fever, chills, or rigors. Does not have any visual or hearing problems. Denies any chest pain, headaches, palpitations,  lightheadedness, dizziness, shortness of breath, or cough. Appetite is good. Denies any GERD symptoms. Denies any difficulty with swallowing, nausea, or vomiting.  Denies any abdominal pain, diarrhea or constipation. Urinary retention with suprapubic catheter. No insomnia. No active bleeding. No rash.     PHYSICAL EXAMINATION:  Vitals:    03/28/19 0953   BP: (!) 82/47   Pulse: 81   Resp: 16   Temp: 97.1  F (36.2  C)   SpO2: 95%   Weight: 145 lb 9.6 oz (66 kg)       GENERAL: Awake, Alert, oriented x3, not in any form of acute distress, answers questions appropriately, follows simple commands, conversant  HEENT: Head is normocephalic with normal hair distribution. No evidence of trauma. Ears: No acute purulent discharge. Eyes: Conjunctivae pink with no scleral jaundice. Nose: Normal mucosa and septum. NECK: Supple with no cervical or supraclavicular lymphadenopathy. Trachea is midline.   CHEST: No tenderness or deformity, no crepitus  LUNG: Clear to auscultation with good chest expansion. There are no crackles or wheezes, normal AP diameter.  BACK: No kyphosis of the thoracic spine. Symmetric, no curvature, ROM normal, no CVA tenderness, no spinal tenderness   CVS: There is good S1  S2, there are no murmurs, rubs, gallops, or heaves, rhythm is regular,  2+ pulses symmetric in all extremities.  ABDOMEN: Globular and soft, nontender to palpation, non distended, no masses, no organomegaly, good bowel sounds, no rebound or guarding, no peritoneal signs.   EXTREMITIES:  Decreased range of motion on both upper and lower extremities, there is no tenderness to palpation, bilateral lower extremity edema, no cyanosis or clubbing, no calf tenderness.  Pulses equal in all extremities, normal cap refill, no joint swelling.  SKIN: Warm and dry, no erythema noted.  Skin color, texture, no rashes or lesions.  NEUROLOGICAL: The patient is oriented to person, place and time. Strength and sensation are grossly intact. Face is  symmetric.    LABS:  All labs reviewed in the nursing home record.    ASSESSMENT/PLAN:    1. Urinary retention - Continue Flomax and suprapubic catheter.  Followed by Urology   2. Bilateral lower extremity edema - Worsening, will start ROSEMARY hose - on in morning and off on evening   3. Suprapubic catheter (H) - See above   4. Benign prostatic hyperplasia with urinary retention - Continue Flomax                               Electronically signed by:  John Oneil CNP

## 2021-05-27 NOTE — PROGRESS NOTES
[facility]    Facility:   [facility] [[id number]]   Code Status: DNR/DNI      CHIEF COMPLAINT/REASON FOR VISIT:  Chief Complaint   Patient presents with     Review Of Multiple Medical Conditions       HISTORY:        HPI: Ronnie is a 89 y.o. male who was admitted to the hospital due to frequent falls and failure to thrive.  He had progressive weakness and now required wheelchair.  He had BPH with urinary retention requiring chronic indwelling Bar catheter.  While he was hospitalized it was recommended that he have a suprapubic catheter placed since there was erosion of the penis from the indwelling Bar catheter.  This procedure was done.  It was determined that he was at risk for aspiration pneumonia and restrictions of diet were imposed.  At the same time it was determined that he is at risk for dehydration and he required IV fluids.  However he became fluid overloaded.  It was at this point that discussion of hospice consultation was done and this occurred, but there were signs of improvement of his condition with some clearing of confusion with the treatment of the UTI and appearance of some improvement of overall condition, such that his daughter wanted to try transitional care unit for strengthening and potential for long-term care placement knowing that hospice is always an option in the future.    Upon current review of systems he is doing well.  He states he has no new health problem.  Specifically he does not have fevers or chills.  He does not have cough or shortness of breath.  He does not have chest pain or palpitations of the heart.  He does not have abdominal pain or nausea.  Per assessment he requires nectar thick liquids.    Past Medical History:   Diagnosis Date     Arthropathy, multiple sites      Benign non-nodular prostatic hyperplasia without lower urinary tract symptoms      Chronic indwelling Bar catheter      Depressive disorder      Dysphagia       FTT (failure to thrive) in adult      Gout      Hearing loss      Onychomycosis      PAD (peripheral artery disease) (H)      Penile erosion     d/t rivera cath             Family History   Problem Relation Age of Onset     Stroke Mother      Lung cancer Sister      Stroke Brother      Brain cancer Sister      Social History     Socioeconomic History     Marital status:      Spouse name: Not on file     Number of children: Not on file     Years of education: Not on file     Highest education level: Not on file   Occupational History     Not on file   Social Needs     Financial resource strain: Not on file     Food insecurity:     Worry: Not on file     Inability: Not on file     Transportation needs:     Medical: Not on file     Non-medical: Not on file   Tobacco Use     Smoking status: Former Smoker     Packs/day: 1.00     Years: 10.00     Pack years: 10.00     Types: Cigarettes     Last attempt to quit: 5/10/1963     Years since quittin.9     Smokeless tobacco: Never Used   Substance and Sexual Activity     Alcohol use: Yes     Alcohol/week: 4.2 oz     Types: 7 Cans of beer per week     Comment: 1 beer per day     Drug use: Not on file     Sexual activity: Not on file   Lifestyle     Physical activity:     Days per week: Not on file     Minutes per session: Not on file     Stress: Not on file   Relationships     Social connections:     Talks on phone: Not on file     Gets together: Not on file     Attends Latter-day service: Not on file     Active member of club or organization: Not on file     Attends meetings of clubs or organizations: Not on file     Relationship status: Not on file     Intimate partner violence:     Fear of current or ex partner: Not on file     Emotionally abused: Not on file     Physically abused: Not on file     Forced sexual activity: Not on file   Other Topics Concern     Not on file   Social History Narrative     Not on file         Review of Systems    .  Vitals:    19  1650   BP: 105/54   Pulse: 80   Resp: 17   Temp: (!) 96.3  F (35.7  C)   SpO2: 98%       Physical Exam   Constitutional: No distress.   HENT:   Nose: Nose normal.   Eyes: Right eye exhibits no discharge. Left eye exhibits no discharge.   Neck: No JVD present.   Cardiovascular: Normal heart sounds.   Pulmonary/Chest: Breath sounds normal. No respiratory distress.   Musculoskeletal: He exhibits no edema.   Neurological: He is alert.   Psychiatric: He has a normal mood and affect.   Nursing note and vitals reviewed.        LABS:   No new laboratory testing    ASSESSMENT:      ICD-10-CM    1. Benign prostatic hyperplasia with urinary retention N40.1     R33.8    2. Suprapubic catheter (H) Z93.59    3. Generalized weakness R53.1    4. Frequent falls R29.6        PLAN:    Continue current plans      Electronically signed by: Bayron Hall MD

## 2021-05-27 NOTE — PROGRESS NOTES
[facility]    Facility:   [facility] [[id number]]   Code Status: DNR/DNI      CHIEF COMPLAINT/REASON FOR VISIT:  Chief Complaint   Patient presents with     Review Of Multiple Medical Conditions       HISTORY:      HPI: Ronnie is a 89 y.o. male who was admitted to the hospital due to frequent falls and failure to thrive.  He had progressive weakness and now required wheelchair.  He had BPH with urinary retention requiring chronic indwelling Bar catheter.  While he was hospitalized it was recommended that he have a suprapubic catheter placed since there was erosion of the penis from the indwelling Bar catheter.  This procedure was done.  It was determined that he was at risk for aspiration pneumonia and restrictions of diet were imposed.  At the same time it was determined that he is at risk for dehydration and he required IV fluids.  However he became fluid overloaded.  It was at this point that discussion of hospice consultation was done and this occurred, but there were signs of improvement of his condition with some clearing of confusion with the treatment of the UTI and appearance of some improvement of overall condition, such that his daughter wanted to try transitional care unit for strengthening and potential for long-term care placement knowing that hospice is always an option in the future.    Upon current review of systems he feels that things are going well.  He is not having any new problems such as fevers or chills or cough or shortness of breath or chest pain or palpitations of the heart or abdominal pain or nausea.    Past Medical History:   Diagnosis Date     Arthropathy, multiple sites      Benign non-nodular prostatic hyperplasia without lower urinary tract symptoms      Chronic indwelling Bar catheter      Depressive disorder      Dysphagia      FTT (failure to thrive) in adult      Gout      Hearing loss      Onychomycosis      PAD (peripheral artery  disease) (H)      Penile erosion     d/t rivera cath             Family History   Problem Relation Age of Onset     Stroke Mother      Lung cancer Sister      Stroke Brother      Brain cancer Sister      Social History     Socioeconomic History     Marital status:      Spouse name: Not on file     Number of children: Not on file     Years of education: Not on file     Highest education level: Not on file   Occupational History     Not on file   Social Needs     Financial resource strain: Not on file     Food insecurity:     Worry: Not on file     Inability: Not on file     Transportation needs:     Medical: Not on file     Non-medical: Not on file   Tobacco Use     Smoking status: Former Smoker     Packs/day: 1.00     Years: 10.00     Pack years: 10.00     Types: Cigarettes     Last attempt to quit: 5/10/1963     Years since quittin.9     Smokeless tobacco: Never Used   Substance and Sexual Activity     Alcohol use: Yes     Alcohol/week: 4.2 oz     Types: 7 Cans of beer per week     Comment: 1 beer per day     Drug use: Not on file     Sexual activity: Not on file   Lifestyle     Physical activity:     Days per week: Not on file     Minutes per session: Not on file     Stress: Not on file   Relationships     Social connections:     Talks on phone: Not on file     Gets together: Not on file     Attends Taoist service: Not on file     Active member of club or organization: Not on file     Attends meetings of clubs or organizations: Not on file     Relationship status: Not on file     Intimate partner violence:     Fear of current or ex partner: Not on file     Emotionally abused: Not on file     Physically abused: Not on file     Forced sexual activity: Not on file   Other Topics Concern     Not on file   Social History Narrative     Not on file         Review of Systems    .  Vitals:    19 2049   BP: 120/64   Pulse: 77   Resp: 18   Temp: 98.1  F (36.7  C)   SpO2: 90%       Physical Exam    Constitutional: No distress.   HENT:   Nose: Nose normal.   Eyes: Right eye exhibits no discharge. Left eye exhibits no discharge.   Neck: No JVD present.   Cardiovascular: Normal heart sounds.   Pulmonary/Chest: Breath sounds normal. No respiratory distress.   Abdominal: Bowel sounds are normal. There is no tenderness.   Musculoskeletal: He exhibits no edema.   Neurological: He is alert.   Psychiatric: He has a normal mood and affect.   Nursing note and vitals reviewed.        LABS:   No new laboratory testing.    ASSESSMENT:      ICD-10-CM    1. Generalized weakness R53.1    2. Frequent falls R29.6    3. Benign prostatic hyperplasia with urinary retention N40.1     R33.8    4. Suprapubic catheter (H) Z93.59        PLAN:    Continue with current plans.      Electronically signed by: Bayron Hall MD

## 2021-06-02 VITALS — WEIGHT: 141 LBS

## 2021-06-02 VITALS — WEIGHT: 141.3 LBS

## 2021-06-02 VITALS — WEIGHT: 139.2 LBS | WEIGHT: 144 LBS

## 2021-06-02 VITALS — WEIGHT: 145.6 LBS

## 2021-06-02 VITALS — WEIGHT: 143.8 LBS

## 2021-06-02 VITALS — WEIGHT: 148.8 LBS

## 2021-06-18 NOTE — LETTER
Letter by Bayron Hall MD at      Author: Bayron Hall MD Service: -- Author Type: --    Filed:  Encounter Date: 2/25/2019 Status: (Other)         Patient: Ronnie Stein   MR Number: [id number]   YOB: 1924   Date of Visit: 2/25/2019     [facility]    Facility:   [facility] [[id number]]   Code Status: DNR/DNI      CHIEF COMPLAINT/REASON FOR VISIT:  Chief Complaint   Patient presents with   ? Review Of Multiple Medical Conditions       HISTORY:      HPI: Ronnie is a 89 y.o. male who was admitted to the hospital due to frequent falls and failure to thrive.  He had progressive weakness and now required wheelchair.  He had BPH with urinary retention requiring chronic indwelling Bar catheter.  While he was hospitalized it was recommended that he have a suprapubic catheter placed since there was erosion of the penis from the indwelling Bar catheter.  This procedure was done.  It was determined that he was at risk for aspiration pneumonia and restrictions of diet were imposed.  At the same time it was determined that he is at risk for dehydration and he required IV fluids.  However he became fluid overloaded.  It was at this point that discussion of hospice consultation was done and this occurred, but there were signs of improvement of his condition with some clearing of confusion with the treatment of the UTI and appearance of some improvement of overall condition, such that his daughter wanted to try transitional care unit for strengthening and potential for long-term care placement knowing that hospice is always an option in the future.    Upon current review of systems, he is feeling better.  He is not having fevers or chills.  He is not having cough or shortness of breath.  He is not having chest pain or palpitations of the heart.  He is not having abdominal pain or nausea.    Past Medical History:   Diagnosis Date   ? Arthropathy, multiple sites    ?  Benign non-nodular prostatic hyperplasia without lower urinary tract symptoms    ? Chronic indwelling Rivera catheter    ? Depressive disorder    ? Dysphagia    ? FTT (failure to thrive) in adult    ? Gout    ? Hearing loss    ? Onychomycosis    ? PAD (peripheral artery disease) (H)    ? Penile erosion     d/t rivera cath             Family History   Problem Relation Age of Onset   ? Stroke Mother    ? Lung cancer Sister    ? Stroke Brother    ? Brain cancer Sister      Social History     Socioeconomic History   ? Marital status:      Spouse name: Not on file   ? Number of children: Not on file   ? Years of education: Not on file   ? Highest education level: Not on file   Occupational History   ? Not on file   Social Needs   ? Financial resource strain: Not on file   ? Food insecurity:     Worry: Not on file     Inability: Not on file   ? Transportation needs:     Medical: Not on file     Non-medical: Not on file   Tobacco Use   ? Smoking status: Former Smoker     Packs/day: 1.00     Years: 10.00     Pack years: 10.00     Types: Cigarettes     Last attempt to quit: 5/10/1963     Years since quittin.8   ? Smokeless tobacco: Never Used   Substance and Sexual Activity   ? Alcohol use: Yes     Alcohol/week: 4.2 oz     Types: 7 Cans of beer per week     Comment: 1 beer per day   ? Drug use: Not on file   ? Sexual activity: Not on file   Lifestyle   ? Physical activity:     Days per week: Not on file     Minutes per session: Not on file   ? Stress: Not on file   Relationships   ? Social connections:     Talks on phone: Not on file     Gets together: Not on file     Attends Tenriism service: Not on file     Active member of club or organization: Not on file     Attends meetings of clubs or organizations: Not on file     Relationship status: Not on file   ? Intimate partner violence:     Fear of current or ex partner: Not on file     Emotionally abused: Not on file     Physically abused: Not on file     Forced  sexual activity: Not on file   Other Topics Concern   ? Not on file   Social History Narrative   ? Not on file         Review of Systems    .  Vitals:    02/25/19 1007   BP: 130/58   Pulse: 78   Resp: 19   Temp: 97.7  F (36.5  C)   SpO2: 98%       Physical Exam   Constitutional: No distress.   HENT:   Nose: Nose normal.   Eyes: Right eye exhibits no discharge. Left eye exhibits no discharge.   Neck: No JVD present.   Cardiovascular: Normal heart sounds.   Pulmonary/Chest: Breath sounds normal. No respiratory distress.   Abdominal: Soft. Bowel sounds are normal. He exhibits no distension. There is no tenderness.   Neurological: He is alert.   Conversant   Psychiatric: He has a normal mood and affect.   Nursing note and vitals reviewed.        LABS:   Urinalysis and urine culture were negative.    ASSESSMENT:      ICD-10-CM    1. Frequent falls R29.6    2. Benign prostatic hyperplasia with urinary retention N40.1     R33.8    3. Suprapubic catheter (H) Z93.59    4. Generalized weakness R53.1        PLAN:    Continue with current efforts of strengthening and evaluation of ability for ADLs.  Continue monitoring medical conditions.      Electronically signed by: Bayron Hall MD

## 2021-06-18 NOTE — LETTER
Letter by John Oneil CNP at      Author: John Oneil CNP Service: -- Author Type: --    Filed:  Encounter Date: 2019 Status: (Other)         Patient: Ronnie Stein   MR Number: [id number]   YOB: 1924   Date of Visit: 2019          [facility]    NAME:  Ronnie Stein             :  1924  MRN: [id number]  CODE STATUS:  DNR/DNI    FACILITY:  [facility] [[id number]]       ROOM:   814    CHIEF COMPLAINT/REASON FOR VISIT:  Chief Complaint   Patient presents with   ? Problem Visit     Urinary retention, recurrent falls, Dysphagia     HISTORY OF PRESENT ILLNESS: Ronnie Stein is a 89 y.o. male with Peripheral artery disease and Chronic indwelling rivera catheter, history of recurrent falls, wheelchair bound since admission in 2018 who was admitted on 2019 for failure to thrive, dehydration/malnutrition and recurrent falls. He presented to ER for evaluation following a fall and dizziness. No LOC.     On admission, he was noted to have multiple wounds feet, elbow and failure to thrive with prealbumin 7. Speech therapist following, concerned for aspiration. Now on dysphagia diet which puts him at higher risk of dehydration. He was treated with IVF on admission for dehydration but developed hypoxemia due to volume overload. IVF stopped. Dr. Tam discussed with family that hospice might be a better option and hospice consulted. Hospice Liaison met with patient and daughter on  and felt he was not hospice appropriate at the time. Patient at high risk for aspiration and also at very high risk for dehydration and acute kidney injury given poor oral intake. He continues to have poor oral intake and losing weight since admission. Yesterday(), Hospice Liaison reviewed patient's chart and they now agree that he is appropriate for hospice. Discussed with patient's daughter  who thinks patient is  clinically doing little better and his confusion resolved. She would like to continue current management for now and is not ready for hospice or comfort care at this time. Patient had been little confused early this week which was likely due to UTI. Currently on antibiotic for UTI, his confusion much improved and daughter would like to see if he can get stronger again. She would like to pursue TCU or long term placement at this time but knows that hospice is an option if he fails or worsening.  Repeat CXR 2/14/19 showed improved finding. He continues to require some supplemental oxygen mostly with sleeping.       Patient was also seen by Urology for rivera related penile erosion, recommended suprapubic tube placement which was placed on 2/12/19 by IR. He was noted to have E. Coli UTI now on oral antibiotic.       ASSESSMENT and PLAN      Principal Problem:  Recurrent Falls  Failure to thrive   Dysphagia     Patient with history of recurrent falls, admitted with fall and dizziness. Suspected etiology include dehydration, malnutrition.     Continue therapies.     Continue dysphagia diet per speech therapies. Aspiration precautions    High risk for dehydration and acute kidney injury.     Ensure supplement (he does not like the taste)    Discussed with nursing to assist patient at all times with feeding. Please keep recording his oral intake and encourage PO intake    Wound care following for multiple wounds    Hospice liaison met with patient and daughter 2/12/2019 - felt not hospice appropriate at the time. Now with poor oral intake and weight loss since then, he is hospice appropriate per hospice liaison. Discussed with daughter yesterday (2/14/19) who is not ready for hospice or comfort care at this time. But she understands that it is an option if he fails and/or they change their mind.   -Consider palliative care consult     Penile erosion     Chronic indwelling Rivera catheter  E. 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 Will need to be changed every 4 wks    Continue Keflex to complete the course.   Active Problems:  Cough, hypoxemia: fluid overload on CXR 2/12. Also at high risk for aspiration    IVF stopped 2/12 given CXR findings with pulmonary venous congestion. He was weaned off oxygen, but now back on 2L oxygen overnight 2/13-2/14.     Obtain CXR today.     Oxygen as needed.     Aspiration precautions    Bronchodilator as needed.     If worsened, consider CT chest for further evaluation   Benign non-nodular prostatic hyperplasia without lower urinary tract symptoms    On Flomax    Urology following, appreciated    ACP (advance care planning)    DNR.     Consider hospice as outpatient  Patient was stabilized and transferred to TCU for continued rehabilitation.    Today, patient is seen at the bedside.  He continues to work with ST for dysphagia.  He continues to have generalized weakness and is working with therapies.  He has not had any recent falls although he has a h/o recurrent falls..      Past Medical History:   Diagnosis Date   ? Arthropathy, multiple sites    ? Benign non-nodular prostatic hyperplasia without lower urinary tract symptoms    ? Chronic indwelling Rivera catheter    ? Depressive disorder    ? Dysphagia    ? FTT (failure to thrive) in adult    ? Gout    ? Hearing loss    ? Onychomycosis    ? PAD (peripheral artery disease) (H)    ? Penile erosion     d/t rivera cath     Past Surgical History:   Procedure Laterality Date   ? CERVICAL LAMINECTOMY     ? LUMBAR LAMINECTOMY     ? DE REMOVE LENS MATERIAL PHACOFRAGMT  6/1/06, 6/15/06     Family History   Problem Relation Age of Onset   ? Stroke Mother    ? Lung cancer Sister    ? Stroke Brother    ? Brain cancer Sister      Social History     Socioeconomic  History   ? Marital status:      Spouse name: Not on file   ? Number of children: Not on file   ? Years of education: Not on file   ? Highest education level: Not on file   Occupational History   ? Not on file   Social Needs   ? Financial resource strain: Not on file   ? Food insecurity:     Worry: Not on file     Inability: Not on file   ? Transportation needs:     Medical: Not on file     Non-medical: Not on file   Tobacco Use   ? Smoking status: Former Smoker     Packs/day: 1.00     Years: 10.00     Pack years: 10.00     Types: Cigarettes     Last attempt to quit: 5/10/1963     Years since quittin.8   ? Smokeless tobacco: Never Used   Substance and Sexual Activity   ? Alcohol use: Yes     Alcohol/week: 4.2 oz     Types: 7 Cans of beer per week     Comment: 1 beer per day   ? Drug use: Not on file   ? Sexual activity: Not on file   Lifestyle   ? Physical activity:     Days per week: Not on file     Minutes per session: Not on file   ? Stress: Not on file   Relationships   ? Social connections:     Talks on phone: Not on file     Gets together: Not on file     Attends Mosque service: Not on file     Active member of club or organization: Not on file     Attends meetings of clubs or organizations: Not on file     Relationship status: Not on file   ? Intimate partner violence:     Fear of current or ex partner: Not on file     Emotionally abused: Not on file     Physically abused: Not on file     Forced sexual activity: Not on file   Other Topics Concern   ? Not on file   Social History Narrative   ? Not on file     No Known Allergies     Current Outpatient Medications   Medication Sig Dispense Refill   ? acetaminophen (TYLENOL) 500 MG tablet Take 1,000 mg by mouth daily. 1h prior to dressing changes, and two times a day prn at least 4h between doses           ? miconazole (MICOTIN) 2 % cream Apply 1 application topically 2 (two) times a day. Apply to penile erosion     ? multivitamin with minerals  tablet Take 1 tablet by mouth daily.     ? tamsulosin (FLOMAX) 0.4 mg cap Take 0.4 mg by mouth Daily after breakfast.       No current facility-administered medications for this visit.      REVIEW OF SYSTEMS:    Currently, no fever, chills, or rigors. Does not have any visual or hearing problems. Denies any chest pain, headaches, palpitations, lightheadedness, dizziness, shortness of breath, or cough. Appetite is good. Denies any GERD symptoms. Denies any difficulty with swallowing, nausea, or vomiting.  Denies any abdominal pain, diarrhea or constipation. Urinary retention with suprapubic catheter. No insomnia. No active bleeding. No rash.     PHYSICAL EXAMINATION:  Vitals:    03/10/19 1311   BP: 120/62   Pulse: 86   Resp: 17   Temp: 96.6  F (35.9  C)   SpO2: 90%   Weight: 144 lb (65.3 kg)       GENERAL: Awake, Alert, oriented x3, not in any form of acute distress, answers questions appropriately, follows simple commands, conversant  HEENT: Head is normocephalic with normal hair distribution. No evidence of trauma. Ears: No acute purulent discharge. Eyes: Conjunctivae pink with no scleral jaundice. Nose: Normal mucosa and septum. NECK: Supple with no cervical or supraclavicular lymphadenopathy. Trachea is midline.   CHEST: No tenderness or deformity, no crepitus  LUNG: Clear to auscultation with good chest expansion. There are no crackles or wheezes, normal AP diameter.  BACK: No kyphosis of the thoracic spine. Symmetric, no curvature, ROM normal, no CVA tenderness, no spinal tenderness   CVS: There is good S1  S2, there are no murmurs, rubs, gallops, or heaves, rhythm is regular,  2+ pulses symmetric in all extremities.  ABDOMEN: Globular and soft, nontender to palpation, non distended, no masses, no organomegaly, good bowel sounds, no rebound or guarding, no peritoneal signs.   EXTREMITIES:  Full range of motion on both upper and lower extremities, there is no tenderness to palpation, no pedal edema, no cyanosis  or clubbing, no calf tenderness.  Pulses equal in all extremities, normal cap refill, no joint swelling.  SKIN: Warm and dry, no erythema noted.  Skin color, texture, no rashes or lesions.  NEUROLOGICAL: The patient is oriented to person, place and time. Strength and sensation are grossly intact. Face is symmetric.    LABS:  All labs reviewed in the nursing home record.    ASSESSMENT/PLAN:    1. Benign prostatic hyperplasia with urinary retention - s/p suprapubic catheter and continue Flomax   2. Suprapubic catheter (H) - due to penile erosion   3. Generalized weakness - Continue PT/OT   4. PAD (peripheral artery disease) (H) - Stable   5. FTT (failure to thrive) in adult - Consider Hospice as outpatient due to poor oral intake and weight loss        Electronically signed by:  John Oneil CNP    35 minutes TT of which 50% was spent in counseling and coordination of care of the above plan.    Time spent in interview and examination of patient, review of available records, and discussion with nursing staff. Continue care plan, efforts at therapy, and monitor nutritional status.

## 2021-06-18 NOTE — LETTER
Letter by John Oneil CNP at      Author: John Oneil CNP Service: -- Author Type: --    Filed:  Encounter Date: 3/7/2019 Status: (Other)         Patient: Ronnie Stein   MR Number: [id number]   YOB: 1924   Date of Visit: 3/7/2019          [facility]    NAME:  Ronnie Stein             :  1924  MRN: [id number]  CODE STATUS:  DNR/DNI    FACILITY:  [facility] [[id number]]       ROOM:   814    CHIEF COMPLAINT/REASON FOR VISIT:  Chief Complaint   Patient presents with   ? Problem Visit     HISTORY OF PRESENT ILLNESS: Ronnie Stein is a 89 y.o. male with Peripheral artery disease and Chronic indwelling rivera catheter, history of recurrent falls, wheelchair bound since admission in 2018 who was admitted on 2019 for failure to thrive, dehydration/malnutrition and recurrent falls. He presented to ER for evaluation following a fall and dizziness. No LOC.     On admission, he was noted to have multiple wounds feet, elbow and failure to thrive with prealbumin 7. Speech therapist following, concerned for aspiration. Now on dysphagia diet which puts him at higher risk of dehydration. He was treated with IVF on admission for dehydration but developed hypoxemia due to volume overload. IVF stopped. Dr. Tam discussed with family that hospice might be a better option and hospice consulted. Hospice Liaison met with patient and daughter on  and felt he was not hospice appropriate at the time. Patient at high risk for aspiration and also at very high risk for dehydration and acute kidney injury given poor oral intake. He continues to have poor oral intake and losing weight since admission. Yesterday(), Hospice Liaison reviewed patient's chart and they now agree that he is appropriate for hospice. Discussed with patient's daughter  who thinks patient is clinically doing little better and his confusion  resolved. She would like to continue current management for now and is not ready for hospice or comfort care at this time. Patient had been little confused early this week which was likely due to UTI. Currently on antibiotic for UTI, his confusion much improved and daughter would like to see if he can get stronger again. She would like to pursue TCU or long term placement at this time but knows that hospice is an option if he fails or worsening.  Repeat CXR 2/14/19 showed improved finding. He continues to require some supplemental oxygen mostly with sleeping.       Patient was also seen by Urology for rivera related penile erosion, recommended suprapubic tube placement which was placed on 2/12/19 by IR. He was noted to have E. Coli UTI now on oral antibiotic.       ASSESSMENT and PLAN      Principal Problem:  Recurrent Falls  Failure to thrive   Dysphagia     Patient with history of recurrent falls, admitted with fall and dizziness. Suspected etiology include dehydration, malnutrition.     Continue therapies.     Continue dysphagia diet per speech therapies. Aspiration precautions    High risk for dehydration and acute kidney injury.     Ensure supplement (he does not like the taste)    Discussed with nursing to assist patient at all times with feeding. Please keep recording his oral intake and encourage PO intake    Wound care following for multiple wounds    Hospice liaison met with patient and daughter 2/12/2019 - felt not hospice appropriate at the time. Now with poor oral intake and weight loss since then, he is hospice appropriate per hospice liaison. Discussed with daughter yesterday (2/14/19) who is not ready for hospice or comfort care at this time. But she understands that it is an option if he fails and/or they change their mind.   -Consider palliative care consult     Penile erosion     Chronic indwelling Rivera catheter  E. 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 Will need to be changed every 4 wks    Continue Keflex to complete the course.   Active Problems:  Cough, hypoxemia: fluid overload on CXR 2/12. Also at high risk for aspiration    IVF stopped 2/12 given CXR findings with pulmonary venous congestion. He was weaned off oxygen, but now back on 2L oxygen overnight 2/13-2/14.     Obtain CXR today.     Oxygen as needed.     Aspiration precautions    Bronchodilator as needed.     If worsened, consider CT chest for further evaluation   Benign non-nodular prostatic hyperplasia without lower urinary tract symptoms    On Flomax    Urology following, appreciated    ACP (advance care planning)    DNR.     Consider hospice as outpatient  Patient was stabilized and transferred to TCU for continued rehabilitation.    Today, patient is seen at the bedside.  His appetite is poor and he has some weight loss.  He continues to work with therapies.      Past Medical History:   Diagnosis Date   ? Arthropathy, multiple sites    ? Benign non-nodular prostatic hyperplasia without lower urinary tract symptoms    ? Chronic indwelling Rivera catheter    ? Depressive disorder    ? Dysphagia    ? FTT (failure to thrive) in adult    ? Gout    ? Hearing loss    ? Onychomycosis    ? PAD (peripheral artery disease) (H)    ? Penile erosion     d/t rivera cath     Past Surgical History:   Procedure Laterality Date   ? CERVICAL LAMINECTOMY     ? LUMBAR LAMINECTOMY     ? NE REMOVE LENS MATERIAL PHACOFRAGMT  6/1/06, 6/15/06     Family History   Problem Relation Age of Onset   ? Stroke Mother    ? Lung cancer Sister    ? Stroke Brother    ? Brain cancer Sister      Social History     Socioeconomic History   ? Marital status:      Spouse name: Not on file   ? Number of children: Not on file   ? Years of education: Not on file   ?  Highest education level: Not on file   Occupational History   ? Not on file   Social Needs   ? Financial resource strain: Not on file   ? Food insecurity:     Worry: Not on file     Inability: Not on file   ? Transportation needs:     Medical: Not on file     Non-medical: Not on file   Tobacco Use   ? Smoking status: Former Smoker     Packs/day: 1.00     Years: 10.00     Pack years: 10.00     Types: Cigarettes     Last attempt to quit: 5/10/1963     Years since quittin.8   ? Smokeless tobacco: Never Used   Substance and Sexual Activity   ? Alcohol use: Yes     Alcohol/week: 4.2 oz     Types: 7 Cans of beer per week     Comment: 1 beer per day   ? Drug use: Not on file   ? Sexual activity: Not on file   Lifestyle   ? Physical activity:     Days per week: Not on file     Minutes per session: Not on file   ? Stress: Not on file   Relationships   ? Social connections:     Talks on phone: Not on file     Gets together: Not on file     Attends Hoahaoism service: Not on file     Active member of club or organization: Not on file     Attends meetings of clubs or organizations: Not on file     Relationship status: Not on file   ? Intimate partner violence:     Fear of current or ex partner: Not on file     Emotionally abused: Not on file     Physically abused: Not on file     Forced sexual activity: Not on file   Other Topics Concern   ? Not on file   Social History Narrative   ? Not on file     No Known Allergies     Current Outpatient Medications   Medication Sig Dispense Refill   ? acetaminophen (TYLENOL) 500 MG tablet Take 1,000 mg by mouth daily. 1h prior to dressing changes, and two times a day prn at least 4h between doses           ? miconazole (MICOTIN) 2 % cream Apply 1 application topically 2 (two) times a day. Apply to penile erosion     ? multivitamin with minerals tablet Take 1 tablet by mouth daily.     ? tamsulosin (FLOMAX) 0.4 mg cap Take 0.4 mg by mouth Daily after breakfast.       No current  facility-administered medications for this visit.      REVIEW OF SYSTEMS:    Currently, no fever, chills, or rigors. Does not have any visual or hearing problems. Denies any chest pain, headaches, palpitations, lightheadedness, dizziness, shortness of breath, or cough. Appetite is good. Denies any GERD symptoms. Denies any difficulty with swallowing, nausea, or vomiting.  Denies any abdominal pain, diarrhea or constipation. Urinary retention with suprapubic catheter. No insomnia. No active bleeding. No rash.     PHYSICAL EXAMINATION:  Vitals:    03/12/19 0049   BP: 117/66   Pulse: 79   Resp: 17   Temp: (!) 96.4  F (35.8  C)   SpO2: 90%   Weight: 141 lb (64 kg)       GENERAL: Awake, Alert, oriented x3, not in any form of acute distress, answers questions appropriately, follows simple commands, conversant  HEENT: Head is normocephalic with normal hair distribution. No evidence of trauma. Ears: No acute purulent discharge. Eyes: Conjunctivae pink with no scleral jaundice. Nose: Normal mucosa and septum. NECK: Supple with no cervical or supraclavicular lymphadenopathy. Trachea is midline.   CHEST: No tenderness or deformity, no crepitus  LUNG: Clear to auscultation with good chest expansion. There are no crackles or wheezes, normal AP diameter.  BACK: No kyphosis of the thoracic spine. Symmetric, no curvature, ROM normal, no CVA tenderness, no spinal tenderness   CVS: There is good S1  S2, there are no murmurs, rubs, gallops, or heaves, rhythm is regular,  2+ pulses symmetric in all extremities.  ABDOMEN: Globular and soft, nontender to palpation, non distended, no masses, no organomegaly, good bowel sounds, no rebound or guarding, no peritoneal signs.   EXTREMITIES:  Full range of motion on both upper and lower extremities, there is no tenderness to palpation, no pedal edema, no cyanosis or clubbing, no calf tenderness.  Pulses equal in all extremities, normal cap refill, no joint swelling.  SKIN: Warm and dry, no  erythema noted.  Skin color, texture, no rashes or lesions.  NEUROLOGICAL: The patient is oriented to person, place and time. Strength and sensation are grossly intact. Face is symmetric.    LABS:  All labs reviewed in the nursing home record.    ASSESSMENT/PLAN:    1. Generalized weakness - Continue PT/OT   2. FTT (failure to thrive) in adult - Progressing, continues to have poor oral intake and weight loss    3. Benign prostatic hyperplasia with urinary retention - Continue Flomax   4. Suprapubic catheter (H) - due penile erosion and urinary retention              Electronically signed by:  John Oneil CNP

## 2021-06-18 NOTE — LETTER
Letter by Bayron Hall MD at      Author: Bayron Hall MD Service: -- Author Type: --    Filed:  Encounter Date: 2/18/2019 Status: (Other)         Patient: Ronnie Stein   MR Number: [id number]   YOB: 1924   Date of Visit: 2/18/2019     [facility]      Facility:    [facility] [[id number]]  Code Status: DNR      Chief Complaint/Reason for Visit:  Chief Complaint   Patient presents with   ? H & P       HPI:   Ronnie is a 89 y.o. male who was admitted to the hospital due to frequent falls and failure to thrive.  He had progressive weakness and now required wheelchair.  He had BPH with urinary retention requiring chronic indwelling Bar catheter.  While he was hospitalized it was recommended that he have a suprapubic catheter placed since there was erosion of the penis from the indwelling Bar catheter.  This procedure was done.  It was determined that he was at risk for aspiration pneumonia and restrictions of diet were imposed.  At the same time it was determined that he is at risk for dehydration and he required IV fluids.  However he became fluid overloaded.  It was at this point that discussion of hospice consultation was done and this occurred, but there were signs of improvement of his condition with some clearing of confusion with the treatment of the UTI and appearance of some improvement of overall condition, such that his daughter wanted to try transitional care unit for strengthening and potential for long-term care placement knowing that hospice is always an option in the future.    Upon current review of systems his daughter has concern about potential foreign body since this had been raised by a staff member.  It was for this reason that I was called to the room before I had a chance to review the chart, while nursing and myself were involved in assessing the situation.  There is a desire for water rather than having all liquids thickened  out of concern about dehydration.  He is not able to respond meaningfully at the time that I am evaluating, so by review of systems is through his daughter.  There has not been a problem with fevers or chills.  There has not been complaint of cough or shortness of breath or chest pain or palpitations of the heart or abdominal pain or nausea.  She does have concerns about UTI and how to manage this and how to prevent this from occurring in the future.  There is also concerned about management of skin ulceration around the ankle.    Past Medical History:  Past Medical History:   Diagnosis Date   ? Arthropathy, multiple sites    ? Benign non-nodular prostatic hyperplasia without lower urinary tract symptoms    ? Chronic indwelling Rivera catheter    ? Depressive disorder    ? Dysphagia    ? FTT (failure to thrive) in adult    ? Gout    ? Hearing loss    ? Onychomycosis    ? PAD (peripheral artery disease) (H)    ? Penile erosion     d/t rivera cath           Surgical History:  Past Surgical History:   Procedure Laterality Date   ? CERVICAL LAMINECTOMY     ? LUMBAR LAMINECTOMY     ? ID REMOVE LENS MATERIAL PHACOFRAGMT  6/1/06, 6/15/06       Family History:   Family History   Problem Relation Age of Onset   ? Stroke Mother    ? Lung cancer Sister    ? Stroke Brother    ? Brain cancer Sister        Social History:    Social History     Socioeconomic History   ? Marital status:      Spouse name: Not on file   ? Number of children: Not on file   ? Years of education: Not on file   ? Highest education level: Not on file   Occupational History   ? Not on file   Social Needs   ? Financial resource strain: Not on file   ? Food insecurity:     Worry: Not on file     Inability: Not on file   ? Transportation needs:     Medical: Not on file     Non-medical: Not on file   Tobacco Use   ? Smoking status: Former Smoker     Packs/day: 1.00     Years: 10.00     Pack years: 10.00     Types: Cigarettes     Last attempt to quit:  5/10/1963     Years since quittin.8   ? Smokeless tobacco: Never Used   Substance and Sexual Activity   ? Alcohol use: Yes     Alcohol/week: 4.2 oz     Types: 7 Cans of beer per week     Comment: 1 beer per day   ? Drug use: Not on file   ? Sexual activity: Not on file   Lifestyle   ? Physical activity:     Days per week: Not on file     Minutes per session: Not on file   ? Stress: Not on file   Relationships   ? Social connections:     Talks on phone: Not on file     Gets together: Not on file     Attends Mosque service: Not on file     Active member of club or organization: Not on file     Attends meetings of clubs or organizations: Not on file     Relationship status: Not on file   ? Intimate partner violence:     Fear of current or ex partner: Not on file     Emotionally abused: Not on file     Physically abused: Not on file     Forced sexual activity: Not on file   Other Topics Concern   ? Not on file   Social History Narrative   ? Not on file          Review of Systems   All other systems reviewed and are negative.      There were no vitals filed for this visit.    Physical Exam   Constitutional: No distress.   He appears cachectic and dehydrated   HENT:   Dry mucous membranes   Eyes: Right eye exhibits no discharge. Left eye exhibits no discharge.   Neck: No JVD present. No thyromegaly present.   Cardiovascular: Normal heart sounds.   Pulmonary/Chest: Breath sounds normal. No respiratory distress.   Abdominal: Soft. Bowel sounds are normal. He exhibits no distension. There is no tenderness.   Suprapubic catheter present   Musculoskeletal: He exhibits no edema or tenderness.   Lymphadenopathy:     He has no cervical adenopathy.   Neurological:   He is not very responsive in regards to his dehydrated state   Skin:   Ulceration of skin by the ankle area without surrounding warmth or redness and there is no purulent drainage.  Discussion with nursing was done at the time as to continued therapies to this  area as is currently being done.  No sign of infection at this point.  This is full-thickness ulceration but not extending into deeper tissues.   Psychiatric:   Difficult to assess due to his neurologic state.   Nursing note and vitals reviewed.      Medication List:  Current Outpatient Medications   Medication Sig   ? acetaminophen (TYLENOL) 500 MG tablet Take 1,000 mg by mouth daily. 1h prior to dressing changes, and two times a day prn at least 4h between doses         ? miconazole (MICOTIN) 2 % cream Apply 1 application topically 2 (two) times a day. Apply to penile erosion   ? multivitamin with minerals tablet Take 1 tablet by mouth daily.   ? tamsulosin (FLOMAX) 0.4 mg cap Take 0.4 mg by mouth Daily after breakfast.       Labs:  No new laboratory testing    Assessment:     ICD-10-CM    1. Benign prostatic hyperplasia with urinary retention N40.1     R33.8    2. Frequent falls R29.6    3. Generalized weakness R53.1    4. Suprapubic catheter (H) Z93.59    5. PAD (peripheral artery disease) (H) I73.9        Plan:  I had extensive discussion with his daughter both while in the room during the time of history and physical but also separately to discuss the current situation.  I explained to her that I was not aware of his underlying medical conditions when I entered the room and that he had the appearance of someone who was near death by my first impression.  After discovering all of the situations for him I explained to her that I see no evidence for foreign body, but rather erosion of the undersurface of the penis secondary to the indwelling Bar catheter previously.  At this point I explained there is no sign of infection and that this will heal by itself without any intervention of suturing or other type of procedure and since he now has an indwelling Bar catheter, it is not of concern if this area heals with scar tissue.  I explained in regards to any Bar catheter, that the presence of that catheter  increases his risk for urinary tract infection, but the alternative in his situation would be obstructive uropathy with resultant kidney failure.  I also discussed risks versus benefits regarding dehydration versus aspiration pneumonia, and she is in agreement to allow him to drink water without thickening, but to continue with the other dietary restrictions for all other intake including the thickened liquids.  I encouraged her to be ready for a time when his health will take a turn for the worse again and the value of hospice care to manage comfort cares since that would be the top priority at that time and she is in agreement.      In regards to other management issues at this time I explained that Flomax can be discontinued since he does have the suprapubic catheter.  I am willing to check a urinalysis and urine culture 1 day after completion of antibiotics, but I discussed the concerns about antibiotic resistance that can develop if there is continuous treatment for asymptomatic bacteriuria.  I agreed to having a flat plate x-ray of the abdomen looking for a foreign body in the bladder since I explained to her that there is no way to determine that other than cystoscopy or an x-ray, but that if there is evidence for foreign body, I am not sure that any intervention is required, but if that is so desired, consultation would be needed.        Electronically signed by: Bayron Hall MD

## 2021-06-18 NOTE — LETTER
Letter by Bayron Hall MD at      Author: Bayron Hall MD Service: -- Author Type: --    Filed:  Encounter Date: 3/4/2019 Status: (Other)         Patient: Ronnie Stein   MR Number: [id number]   YOB: 1924   Date of Visit: 3/4/2019     [facility]    Facility:   [facility] [[id number]]   Code Status: DNR/DNI      CHIEF COMPLAINT/REASON FOR VISIT:  Chief Complaint   Patient presents with   ? Review Of Multiple Medical Conditions       HISTORY:      HPI: Ronnie is a 89 y.o. male who was admitted to the hospital due to frequent falls and failure to thrive.  He had progressive weakness and now required wheelchair.  He had BPH with urinary retention requiring chronic indwelling Bar catheter.  While he was hospitalized it was recommended that he have a suprapubic catheter placed since there was erosion of the penis from the indwelling Bar catheter.  This procedure was done.  It was determined that he was at risk for aspiration pneumonia and restrictions of diet were imposed.  At the same time it was determined that he is at risk for dehydration and he required IV fluids.  However he became fluid overloaded.  It was at this point that discussion of hospice consultation was done and this occurred, but there were signs of improvement of his condition with some clearing of confusion with the treatment of the UTI and appearance of some improvement of overall condition, such that his daughter wanted to try transitional care unit for strengthening and potential for long-term care placement knowing that hospice is always an option in the future.    Upon current review of systems, he is not having any new problems and feels that things are going well for him.  He is not having fevers or chills.  He does not have cough or shortness of breath.  He does not of chest pain or palpitations of the heart.  He does not abdominal pain or nausea.  Of note is that he has lost 5  pounds.    Past Medical History:   Diagnosis Date   ? Arthropathy, multiple sites    ? Benign non-nodular prostatic hyperplasia without lower urinary tract symptoms    ? Chronic indwelling Rivera catheter    ? Depressive disorder    ? Dysphagia    ? FTT (failure to thrive) in adult    ? Gout    ? Hearing loss    ? Onychomycosis    ? PAD (peripheral artery disease) (H)    ? Penile erosion     d/t rivera cath             Family History   Problem Relation Age of Onset   ? Stroke Mother    ? Lung cancer Sister    ? Stroke Brother    ? Brain cancer Sister      Social History     Socioeconomic History   ? Marital status:      Spouse name: Not on file   ? Number of children: Not on file   ? Years of education: Not on file   ? Highest education level: Not on file   Occupational History   ? Not on file   Social Needs   ? Financial resource strain: Not on file   ? Food insecurity:     Worry: Not on file     Inability: Not on file   ? Transportation needs:     Medical: Not on file     Non-medical: Not on file   Tobacco Use   ? Smoking status: Former Smoker     Packs/day: 1.00     Years: 10.00     Pack years: 10.00     Types: Cigarettes     Last attempt to quit: 5/10/1963     Years since quittin.8   ? Smokeless tobacco: Never Used   Substance and Sexual Activity   ? Alcohol use: Yes     Alcohol/week: 4.2 oz     Types: 7 Cans of beer per week     Comment: 1 beer per day   ? Drug use: Not on file   ? Sexual activity: Not on file   Lifestyle   ? Physical activity:     Days per week: Not on file     Minutes per session: Not on file   ? Stress: Not on file   Relationships   ? Social connections:     Talks on phone: Not on file     Gets together: Not on file     Attends Roman Catholic service: Not on file     Active member of club or organization: Not on file     Attends meetings of clubs or organizations: Not on file     Relationship status: Not on file   ? Intimate partner violence:     Fear of current or ex partner: Not on  file     Emotionally abused: Not on file     Physically abused: Not on file     Forced sexual activity: Not on file   Other Topics Concern   ? Not on file   Social History Narrative   ? Not on file         Review of Systems    .  Vitals:    03/04/19 2044   BP: 103/59   Pulse: 86   Resp: 16   Temp: (!) 96.1  F (35.6  C)   SpO2: 93%   Weight: 139 lb 3.2 oz (63.1 kg)       Physical Exam   Constitutional: No distress.   HENT:   Nose: Nose normal.   Eyes: Right eye exhibits no discharge. Left eye exhibits no discharge.   Neck: No JVD present.   Cardiovascular: Normal heart sounds.   Pulmonary/Chest: Breath sounds normal. No respiratory distress.   Abdominal: Soft. Bowel sounds are normal. There is no tenderness.   Musculoskeletal: He exhibits no edema.   Neurological: He is alert.   Psychiatric: He has a normal mood and affect.   Nursing note and vitals reviewed.        LABS:   No new laboratory testing.    ASSESSMENT:      ICD-10-CM    1. Frequent falls R29.6    2. Benign prostatic hyperplasia with urinary retention N40.1     R33.8    3. Suprapubic catheter (H) Z93.59    4. Generalized weakness R53.1        PLAN:    Continue with current plans.      Electronically signed by: Bayron Hall MD

## 2021-06-19 NOTE — LETTER
Letter by Bayron Hall MD at      Author: Bayron Hall MD Service: -- Author Type: --    Filed:  Encounter Date: 3/18/2019 Status: (Other)         Patient: Ronnie Stein   MR Number: [id number]   YOB: 1924   Date of Visit: 3/18/2019     [facility]    Facility:   [facility] [[id number]]   Code Status: DNR/DNI      CHIEF COMPLAINT/REASON FOR VISIT:  Chief Complaint   Patient presents with   ? Review Of Multiple Medical Conditions       HISTORY:        HPI: Ronnie is a 89 y.o. male who was admitted to the hospital due to frequent falls and failure to thrive.  He had progressive weakness and now required wheelchair.  He had BPH with urinary retention requiring chronic indwelling Bar catheter.  While he was hospitalized it was recommended that he have a suprapubic catheter placed since there was erosion of the penis from the indwelling Bar catheter.  This procedure was done.  It was determined that he was at risk for aspiration pneumonia and restrictions of diet were imposed.  At the same time it was determined that he is at risk for dehydration and he required IV fluids.  However he became fluid overloaded.  It was at this point that discussion of hospice consultation was done and this occurred, but there were signs of improvement of his condition with some clearing of confusion with the treatment of the UTI and appearance of some improvement of overall condition, such that his daughter wanted to try transitional care unit for strengthening and potential for long-term care placement knowing that hospice is always an option in the future.    Upon current review of systems he is doing well.  He states he has no new health problem.  Specifically he does not have fevers or chills.  He does not have cough or shortness of breath.  He does not have chest pain or palpitations of the heart.  He does not have abdominal pain or nausea.  Per assessment he requires  nectar thick liquids.    Past Medical History:   Diagnosis Date   ? Arthropathy, multiple sites    ? Benign non-nodular prostatic hyperplasia without lower urinary tract symptoms    ? Chronic indwelling Rivera catheter    ? Depressive disorder    ? Dysphagia    ? FTT (failure to thrive) in adult    ? Gout    ? Hearing loss    ? Onychomycosis    ? PAD (peripheral artery disease) (H)    ? Penile erosion     d/t rivera cath             Family History   Problem Relation Age of Onset   ? Stroke Mother    ? Lung cancer Sister    ? Stroke Brother    ? Brain cancer Sister      Social History     Socioeconomic History   ? Marital status:      Spouse name: Not on file   ? Number of children: Not on file   ? Years of education: Not on file   ? Highest education level: Not on file   Occupational History   ? Not on file   Social Needs   ? Financial resource strain: Not on file   ? Food insecurity:     Worry: Not on file     Inability: Not on file   ? Transportation needs:     Medical: Not on file     Non-medical: Not on file   Tobacco Use   ? Smoking status: Former Smoker     Packs/day: 1.00     Years: 10.00     Pack years: 10.00     Types: Cigarettes     Last attempt to quit: 5/10/1963     Years since quittin.9   ? Smokeless tobacco: Never Used   Substance and Sexual Activity   ? Alcohol use: Yes     Alcohol/week: 4.2 oz     Types: 7 Cans of beer per week     Comment: 1 beer per day   ? Drug use: Not on file   ? Sexual activity: Not on file   Lifestyle   ? Physical activity:     Days per week: Not on file     Minutes per session: Not on file   ? Stress: Not on file   Relationships   ? Social connections:     Talks on phone: Not on file     Gets together: Not on file     Attends Zoroastrianism service: Not on file     Active member of club or organization: Not on file     Attends meetings of clubs or organizations: Not on file     Relationship status: Not on file   ? Intimate partner violence:     Fear of current or ex  partner: Not on file     Emotionally abused: Not on file     Physically abused: Not on file     Forced sexual activity: Not on file   Other Topics Concern   ? Not on file   Social History Narrative   ? Not on file         Review of Systems    .  Vitals:    03/18/19 1650   BP: 105/54   Pulse: 80   Resp: 17   Temp: (!) 96.3  F (35.7  C)   SpO2: 98%       Physical Exam   Constitutional: No distress.   HENT:   Nose: Nose normal.   Eyes: Right eye exhibits no discharge. Left eye exhibits no discharge.   Neck: No JVD present.   Cardiovascular: Normal heart sounds.   Pulmonary/Chest: Breath sounds normal. No respiratory distress.   Musculoskeletal: He exhibits no edema.   Neurological: He is alert.   Psychiatric: He has a normal mood and affect.   Nursing note and vitals reviewed.        LABS:   No new laboratory testing    ASSESSMENT:      ICD-10-CM    1. Benign prostatic hyperplasia with urinary retention N40.1     R33.8    2. Suprapubic catheter (H) Z93.59    3. Generalized weakness R53.1    4. Frequent falls R29.6        PLAN:    Continue current plans      Electronically signed by: Bayron Hall MD

## 2021-06-19 NOTE — LETTER
Letter by Bayron Hall MD at      Author: Bayron Hall MD Service: -- Author Type: --    Filed:  Encounter Date: 4/8/2019 Status: (Other)         Patient: Ronnie Stein   MR Number: [id number]   YOB: 1924   Date of Visit: 4/8/2019     [facility]    Facility:   [facility] [[id number]]   Code Status: DNR/DNI      CHIEF COMPLAINT/REASON FOR VISIT:  Chief Complaint   Patient presents with   ? Review Of Multiple Medical Conditions       HISTORY:      HPI: Ronnie is a 89 y.o. male who was admitted to the hospital due to frequent falls and failure to thrive.  He had progressive weakness and now required wheelchair.  He had BPH with urinary retention requiring chronic indwelling Bar catheter.  While he was hospitalized it was recommended that he have a suprapubic catheter placed since there was erosion of the penis from the indwelling Bar catheter.  This procedure was done.  It was determined that he was at risk for aspiration pneumonia and restrictions of diet were imposed.  At the same time it was determined that he is at risk for dehydration and he required IV fluids.  However he became fluid overloaded.  It was at this point that discussion of hospice consultation was done and this occurred, but there were signs of improvement of his condition with some clearing of confusion with the treatment of the UTI and appearance of some improvement of overall condition, such that his daughter wanted to try transitional care unit for strengthening and potential for long-term care placement knowing that hospice is always an option in the future.    Upon current review of systems he is doing well.  He states he is having no new medical problems.  He is not having fevers or chills.  He does not have cough or shortness of breath.  Does not have chest pain or palpitations of the heart.  He does not have abdominal pain or nausea.  He is not having problems with the  suprapubic catheter.      Past Medical History:   Diagnosis Date   ? Arthropathy, multiple sites    ? Benign non-nodular prostatic hyperplasia without lower urinary tract symptoms    ? Chronic indwelling Rivera catheter    ? Depressive disorder    ? Dysphagia    ? FTT (failure to thrive) in adult    ? Gout    ? Hearing loss    ? Onychomycosis    ? PAD (peripheral artery disease) (H)    ? Penile erosion     d/t rivera cath             Family History   Problem Relation Age of Onset   ? Stroke Mother    ? Lung cancer Sister    ? Stroke Brother    ? Brain cancer Sister      Social History     Socioeconomic History   ? Marital status:      Spouse name: Not on file   ? Number of children: Not on file   ? Years of education: Not on file   ? Highest education level: Not on file   Occupational History   ? Not on file   Social Needs   ? Financial resource strain: Not on file   ? Food insecurity:     Worry: Not on file     Inability: Not on file   ? Transportation needs:     Medical: Not on file     Non-medical: Not on file   Tobacco Use   ? Smoking status: Former Smoker     Packs/day: 1.00     Years: 10.00     Pack years: 10.00     Types: Cigarettes     Last attempt to quit: 5/10/1963     Years since quittin.9   ? Smokeless tobacco: Never Used   Substance and Sexual Activity   ? Alcohol use: Yes     Alcohol/week: 4.2 oz     Types: 7 Cans of beer per week     Comment: 1 beer per day   ? Drug use: Not on file   ? Sexual activity: Not on file   Lifestyle   ? Physical activity:     Days per week: Not on file     Minutes per session: Not on file   ? Stress: Not on file   Relationships   ? Social connections:     Talks on phone: Not on file     Gets together: Not on file     Attends Scientologist service: Not on file     Active member of club or organization: Not on file     Attends meetings of clubs or organizations: Not on file     Relationship status: Not on file   ? Intimate partner violence:     Fear of current or ex  partner: Not on file     Emotionally abused: Not on file     Physically abused: Not on file     Forced sexual activity: Not on file   Other Topics Concern   ? Not on file   Social History Narrative   ? Not on file         Review of Systems    .There were no vitals filed for this visit.    Physical Exam   Constitutional: No distress.   HENT:   Nose: Nose normal.   Eyes: Right eye exhibits no discharge. Left eye exhibits no discharge.   Neck: No JVD present.   Cardiovascular: Normal heart sounds.   Pulmonary/Chest: Breath sounds normal. No respiratory distress.   Abdominal: Soft. Bowel sounds are normal. He exhibits no distension. There is no tenderness.   Neurological: He is alert.   Psychiatric: He has a normal mood and affect.   Nursing note and vitals reviewed.        LABS:   No new laboratory testing    ASSESSMENT:      ICD-10-CM    1. Benign prostatic hyperplasia with urinary retention N40.1     R33.8    2. Urinary retention R33.9    3. Generalized weakness R53.1    4. PAD (peripheral artery disease) (H) I73.9    5. Suprapubic catheter (H) Z93.59        PLAN:    Continue monitoring medical conditions.  Continue with therapies as able.    Electronically signed by: Bayron Hall MD

## 2021-06-19 NOTE — LETTER
Letter by John Oneil CNP at      Author: John Oneil CNP Service: -- Author Type: --    Filed:  Encounter Date: 2019 Status: (Other)         Patient: Ronnie Stein   MR Number: [id number]   YOB: 1924   Date of Visit: 2019          [facility]    NAME:  Ronnie Stein             :  1924  MRN: [id number]  CODE STATUS:  DNR/DNI    FACILITY:  [facility] [[id number]]       ROOM:   814    CHIEF COMPLAINT/REASON FOR VISIT:  Chief Complaint   Patient presents with   ? Problem Visit     Generalized weakness     HISTORY OF PRESENT ILLNESS: Ronnie Stein is a 89 y.o. male with Peripheral artery disease and Chronic indwelling rivera catheter, history of recurrent falls, wheelchair bound since admission in 2018 who was admitted on 2019 for failure to thrive, dehydration/malnutrition and recurrent falls. He presented to ER for evaluation following a fall and dizziness. No LOC.     On admission, he was noted to have multiple wounds feet, elbow and failure to thrive with prealbumin 7. Speech therapist following, concerned for aspiration. Now on dysphagia diet which puts him at higher risk of dehydration. He was treated with IVF on admission for dehydration but developed hypoxemia due to volume overload. IVF stopped. Dr. Tam discussed with family that hospice might be a better option and hospice consulted. Hospice Liaison met with patient and daughter on  and felt he was not hospice appropriate at the time. Patient at high risk for aspiration and also at very high risk for dehydration and acute kidney injury given poor oral intake. He continues to have poor oral intake and losing weight since admission. Yesterday(), Hospice Liaison reviewed patient's chart and they now agree that he is appropriate for hospice. Discussed with patient's daughter  who thinks patient is clinically doing little better  and his confusion resolved. She would like to continue current management for now and is not ready for hospice or comfort care at this time. Patient had been little confused early this week which was likely due to UTI. Currently on antibiotic for UTI, his confusion much improved and daughter would like to see if he can get stronger again. She would like to pursue TCU or long term placement at this time but knows that hospice is an option if he fails or worsening.  Repeat CXR 2/14/19 showed improved finding. He continues to require some supplemental oxygen mostly with sleeping.       Patient was also seen by Urology for rivera related penile erosion, recommended suprapubic tube placement which was placed on 2/12/19 by IR. He was noted to have E. Coli UTI now on oral antibiotic.       ASSESSMENT and PLAN      Principal Problem:  Recurrent Falls  Failure to thrive   Dysphagia     Patient with history of recurrent falls, admitted with fall and dizziness. Suspected etiology include dehydration, malnutrition.     Continue therapies.     Continue dysphagia diet per speech therapies. Aspiration precautions    High risk for dehydration and acute kidney injury.     Ensure supplement (he does not like the taste)    Discussed with nursing to assist patient at all times with feeding. Please keep recording his oral intake and encourage PO intake    Wound care following for multiple wounds    Hospice liaison met with patient and daughter 2/12/2019 - felt not hospice appropriate at the time. Now with poor oral intake and weight loss since then, he is hospice appropriate per hospice liaison. Discussed with daughter yesterday (2/14/19) who is not ready for hospice or comfort care at this time. But she understands that it is an option if he fails and/or they change their mind.   -Consider palliative care consult     Penile erosion     Chronic indwelling Rivera catheter  E. 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 Will need to be changed every 4 wks    Continue Keflex to complete the course.   Active Problems:  Cough, hypoxemia: fluid overload on CXR 2/12. Also at high risk for aspiration    IVF stopped 2/12 given CXR findings with pulmonary venous congestion. He was weaned off oxygen, but now back on 2L oxygen overnight 2/13-2/14.     Obtain CXR today.     Oxygen as needed.     Aspiration precautions    Bronchodilator as needed.     If worsened, consider CT chest for further evaluation   Benign non-nodular prostatic hyperplasia without lower urinary tract symptoms    On Flomax    Urology following, appreciated    ACP (advance care planning)    DNR.     Consider hospice as outpatient  Patient was stabilized and transferred to TCU for continued rehabilitation.    Today, patient is seen at the bedside.  He has bilateral lower extremity edema.  He has urinary retention with a suprapubic catheter. Followed by Urology.        Past Medical History:   Diagnosis Date   ? Arthropathy, multiple sites    ? Benign non-nodular prostatic hyperplasia without lower urinary tract symptoms    ? Chronic indwelling Rivera catheter    ? Depressive disorder    ? Dysphagia    ? FTT (failure to thrive) in adult    ? Gout    ? Hearing loss    ? Onychomycosis    ? PAD (peripheral artery disease) (H)    ? Penile erosion     d/t rivera cath     Past Surgical History:   Procedure Laterality Date   ? CERVICAL LAMINECTOMY     ? LUMBAR LAMINECTOMY     ? RI REMOVE LENS MATERIAL PHACOFRAGMT  6/1/06, 6/15/06     Family History   Problem Relation Age of Onset   ? Stroke Mother    ? Lung cancer Sister    ? Stroke Brother    ? Brain cancer Sister      Social History     Socioeconomic History   ? Marital status:      Spouse name: Not on file   ? Number of children: Not on  file   ? Years of education: Not on file   ? Highest education level: Not on file   Occupational History   ? Not on file   Social Needs   ? Financial resource strain: Not on file   ? Food insecurity:     Worry: Not on file     Inability: Not on file   ? Transportation needs:     Medical: Not on file     Non-medical: Not on file   Tobacco Use   ? Smoking status: Former Smoker     Packs/day: 1.00     Years: 10.00     Pack years: 10.00     Types: Cigarettes     Last attempt to quit: 5/10/1963     Years since quittin.9   ? Smokeless tobacco: Never Used   Substance and Sexual Activity   ? Alcohol use: Yes     Alcohol/week: 4.2 oz     Types: 7 Cans of beer per week     Comment: 1 beer per day   ? Drug use: Not on file   ? Sexual activity: Not on file   Lifestyle   ? Physical activity:     Days per week: Not on file     Minutes per session: Not on file   ? Stress: Not on file   Relationships   ? Social connections:     Talks on phone: Not on file     Gets together: Not on file     Attends Tenriism service: Not on file     Active member of club or organization: Not on file     Attends meetings of clubs or organizations: Not on file     Relationship status: Not on file   ? Intimate partner violence:     Fear of current or ex partner: Not on file     Emotionally abused: Not on file     Physically abused: Not on file     Forced sexual activity: Not on file   Other Topics Concern   ? Not on file   Social History Narrative   ? Not on file     No Known Allergies     Current Outpatient Medications   Medication Sig Dispense Refill   ? acetaminophen (TYLENOL) 500 MG tablet Take 1,000 mg by mouth daily. 1h prior to dressing changes, and two times a day prn at least 4h between doses           ? miconazole (MICOTIN) 2 % cream Apply 1 application topically 2 (two) times a day. Apply to penile erosion     ? multivitamin with minerals tablet Take 1 tablet by mouth daily.     ? tamsulosin (FLOMAX) 0.4 mg cap Take 0.4 mg by mouth  Daily after breakfast.       No current facility-administered medications for this visit.      REVIEW OF SYSTEMS:    Currently, no fever, chills, or rigors. Does not have any visual or hearing problems. Denies any chest pain, headaches, palpitations, lightheadedness, dizziness, shortness of breath, or cough. Appetite is good. Denies any GERD symptoms. Denies any difficulty with swallowing, nausea, or vomiting.  Denies any abdominal pain, diarrhea or constipation. Urinary retention with suprapubic catheter. No insomnia. No active bleeding. No rash.     PHYSICAL EXAMINATION:  Vitals:    04/08/19 2247   BP: 110/52   Pulse: 89   Resp: 17   Temp: (!) 96.2  F (35.7  C)   SpO2: 95%   Weight: 143 lb 12.8 oz (65.2 kg)       GENERAL: Awake, Alert, oriented x3, not in any form of acute distress, answers questions appropriately, follows simple commands, conversant  HEENT: Head is normocephalic with normal hair distribution. No evidence of trauma. Ears: No acute purulent discharge. Eyes: Conjunctivae pink with no scleral jaundice. Nose: Normal mucosa and septum. NECK: Supple with no cervical or supraclavicular lymphadenopathy. Trachea is midline.   CHEST: No tenderness or deformity, no crepitus  LUNG: Clear to auscultation with good chest expansion. There are no crackles or wheezes, normal AP diameter.  BACK: No kyphosis of the thoracic spine. Symmetric, no curvature, ROM normal, no CVA tenderness, no spinal tenderness   CVS: There is good S1  S2, there are no murmurs, rubs, gallops, or heaves, rhythm is regular,  2+ pulses symmetric in all extremities.  ABDOMEN: Globular and soft, nontender to palpation, non distended, no masses, no organomegaly, good bowel sounds, no rebound or guarding, no peritoneal signs.   EXTREMITIES:  Decreased range of motion on both upper and lower extremities, there is no tenderness to palpation, bilateral lower extremity edema, no cyanosis or clubbing, no calf tenderness.  Pulses equal in all  extremities, normal cap refill, no joint swelling.  SKIN: Warm and dry, no erythema noted.  Skin color, texture, no rashes or lesions.  NEUROLOGICAL: The patient is oriented to person, place and time. Strength and sensation are grossly intact. Face is symmetric.    LABS:  All labs reviewed in the nursing home record.    ASSESSMENT/PLAN:    1. Generalized weakness - Continue PT/OT   2. Benign prostatic hyperplasia with urinary retention  - Continue Flomax   3. PAD (peripheral artery disease) (H) - Stable   4. Urinary retention - Continue Flomax and suprapubic catheter.  Followed by Urology                             Electronically signed by:  John Oneil CNP

## 2021-06-19 NOTE — LETTER
Letter by Bayron Hall MD at      Author: Bayron Hall MD Service: -- Author Type: --    Filed:  Encounter Date: 3/25/2019 Status: (Other)         Patient: Ronnie Stein   MR Number: [id number]   YOB: 1924   Date of Visit: 3/25/2019     [facility]    Facility:   [facility] [[id number]]   Code Status: DNR/DNI      CHIEF COMPLAINT/REASON FOR VISIT:  Chief Complaint   Patient presents with   ? Review Of Multiple Medical Conditions       HISTORY:      HPI: Ronnie is a 89 y.o. male who was admitted to the hospital due to frequent falls and failure to thrive.  He had progressive weakness and now required wheelchair.  He had BPH with urinary retention requiring chronic indwelling Bar catheter.  While he was hospitalized it was recommended that he have a suprapubic catheter placed since there was erosion of the penis from the indwelling Bar catheter.  This procedure was done.  It was determined that he was at risk for aspiration pneumonia and restrictions of diet were imposed.  At the same time it was determined that he is at risk for dehydration and he required IV fluids.  However he became fluid overloaded.  It was at this point that discussion of hospice consultation was done and this occurred, but there were signs of improvement of his condition with some clearing of confusion with the treatment of the UTI and appearance of some improvement of overall condition, such that his daughter wanted to try transitional care unit for strengthening and potential for long-term care placement knowing that hospice is always an option in the future.    Upon current review of systems he feels that things are going well.  He is not having any new problems such as fevers or chills or cough or shortness of breath or chest pain or palpitations of the heart or abdominal pain or nausea.    Past Medical History:   Diagnosis Date   ? Arthropathy, multiple sites    ? Benign  non-nodular prostatic hyperplasia without lower urinary tract symptoms    ? Chronic indwelling Rivera catheter    ? Depressive disorder    ? Dysphagia    ? FTT (failure to thrive) in adult    ? Gout    ? Hearing loss    ? Onychomycosis    ? PAD (peripheral artery disease) (H)    ? Penile erosion     d/t rivera cath             Family History   Problem Relation Age of Onset   ? Stroke Mother    ? Lung cancer Sister    ? Stroke Brother    ? Brain cancer Sister      Social History     Socioeconomic History   ? Marital status:      Spouse name: Not on file   ? Number of children: Not on file   ? Years of education: Not on file   ? Highest education level: Not on file   Occupational History   ? Not on file   Social Needs   ? Financial resource strain: Not on file   ? Food insecurity:     Worry: Not on file     Inability: Not on file   ? Transportation needs:     Medical: Not on file     Non-medical: Not on file   Tobacco Use   ? Smoking status: Former Smoker     Packs/day: 1.00     Years: 10.00     Pack years: 10.00     Types: Cigarettes     Last attempt to quit: 5/10/1963     Years since quittin.9   ? Smokeless tobacco: Never Used   Substance and Sexual Activity   ? Alcohol use: Yes     Alcohol/week: 4.2 oz     Types: 7 Cans of beer per week     Comment: 1 beer per day   ? Drug use: Not on file   ? Sexual activity: Not on file   Lifestyle   ? Physical activity:     Days per week: Not on file     Minutes per session: Not on file   ? Stress: Not on file   Relationships   ? Social connections:     Talks on phone: Not on file     Gets together: Not on file     Attends Protestant service: Not on file     Active member of club or organization: Not on file     Attends meetings of clubs or organizations: Not on file     Relationship status: Not on file   ? Intimate partner violence:     Fear of current or ex partner: Not on file     Emotionally abused: Not on file     Physically abused: Not on file     Forced sexual  activity: Not on file   Other Topics Concern   ? Not on file   Social History Narrative   ? Not on file         Review of Systems    .  Vitals:    03/25/19 2049   BP: 120/64   Pulse: 77   Resp: 18   Temp: 98.1  F (36.7  C)   SpO2: 90%       Physical Exam   Constitutional: No distress.   HENT:   Nose: Nose normal.   Eyes: Right eye exhibits no discharge. Left eye exhibits no discharge.   Neck: No JVD present.   Cardiovascular: Normal heart sounds.   Pulmonary/Chest: Breath sounds normal. No respiratory distress.   Abdominal: Bowel sounds are normal. There is no tenderness.   Musculoskeletal: He exhibits no edema.   Neurological: He is alert.   Psychiatric: He has a normal mood and affect.   Nursing note and vitals reviewed.        LABS:   No new laboratory testing.    ASSESSMENT:      ICD-10-CM    1. Generalized weakness R53.1    2. Frequent falls R29.6    3. Benign prostatic hyperplasia with urinary retention N40.1     R33.8    4. Suprapubic catheter (H) Z93.59        PLAN:    Continue with current plans.      Electronically signed by: Bayron Hall MD

## 2021-06-19 NOTE — LETTER
Letter by John Oneil CNP at      Author: John Oneil CNP Service: -- Author Type: --    Filed:  Encounter Date: 3/14/2019 Status: (Other)         Patient: Ronnie Stein   MR Number: [id number]   YOB: 1924   Date of Visit: 3/14/2019          [facility]    NAME:  Ronnie Stein             :  1924  MRN: [id number]  CODE STATUS:  DNR/DNI    FACILITY:  [facility] [[id number]]       ROOM:   814    CHIEF COMPLAINT/REASON FOR VISIT:  Chief Complaint   Patient presents with   ? Problem Visit     Generalized weakness     HISTORY OF PRESENT ILLNESS: Ronnie Stein is a 89 y.o. male with Peripheral artery disease and Chronic indwelling rivera catheter, history of recurrent falls, wheelchair bound since admission in 2018 who was admitted on 2019 for failure to thrive, dehydration/malnutrition and recurrent falls. He presented to ER for evaluation following a fall and dizziness. No LOC.     On admission, he was noted to have multiple wounds feet, elbow and failure to thrive with prealbumin 7. Speech therapist following, concerned for aspiration. Now on dysphagia diet which puts him at higher risk of dehydration. He was treated with IVF on admission for dehydration but developed hypoxemia due to volume overload. IVF stopped. Dr. Tam discussed with family that hospice might be a better option and hospice consulted. Hospice Liaison met with patient and daughter on  and felt he was not hospice appropriate at the time. Patient at high risk for aspiration and also at very high risk for dehydration and acute kidney injury given poor oral intake. He continues to have poor oral intake and losing weight since admission. Yesterday(), Hospice Liaison reviewed patient's chart and they now agree that he is appropriate for hospice. Discussed with patient's daughter  who thinks patient is clinically doing little  better and his confusion resolved. She would like to continue current management for now and is not ready for hospice or comfort care at this time. Patient had been little confused early this week which was likely due to UTI. Currently on antibiotic for UTI, his confusion much improved and daughter would like to see if he can get stronger again. She would like to pursue TCU or long term placement at this time but knows that hospice is an option if he fails or worsening.  Repeat CXR 2/14/19 showed improved finding. He continues to require some supplemental oxygen mostly with sleeping.       Patient was also seen by Urology for rivera related penile erosion, recommended suprapubic tube placement which was placed on 2/12/19 by IR. He was noted to have E. Coli UTI now on oral antibiotic.       ASSESSMENT and PLAN      Principal Problem:  Recurrent Falls  Failure to thrive   Dysphagia     Patient with history of recurrent falls, admitted with fall and dizziness. Suspected etiology include dehydration, malnutrition.     Continue therapies.     Continue dysphagia diet per speech therapies. Aspiration precautions    High risk for dehydration and acute kidney injury.     Ensure supplement (he does not like the taste)    Discussed with nursing to assist patient at all times with feeding. Please keep recording his oral intake and encourage PO intake    Wound care following for multiple wounds    Hospice liaison met with patient and daughter 2/12/2019 - felt not hospice appropriate at the time. Now with poor oral intake and weight loss since then, he is hospice appropriate per hospice liaison. Discussed with daughter yesterday (2/14/19) who is not ready for hospice or comfort care at this time. But she understands that it is an option if he fails and/or they change their mind.   -Consider palliative care consult     Penile erosion     Chronic indwelling Rivera catheter  E. 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 Will need to be changed every 4 wks    Continue Keflex to complete the course.   Active Problems:  Cough, hypoxemia: fluid overload on CXR 2/12. Also at high risk for aspiration    IVF stopped 2/12 given CXR findings with pulmonary venous congestion. He was weaned off oxygen, but now back on 2L oxygen overnight 2/13-2/14.     Obtain CXR today.     Oxygen as needed.     Aspiration precautions    Bronchodilator as needed.     If worsened, consider CT chest for further evaluation   Benign non-nodular prostatic hyperplasia without lower urinary tract symptoms    On Flomax    Urology following, appreciated    ACP (advance care planning)    DNR.     Consider hospice as outpatient  Patient was stabilized and transferred to TCU for continued rehabilitation.    Today, patient is seen at the bedside.  His appetite is poor and he has some weight loss.  He continues to work with therapies.      Past Medical History:   Diagnosis Date   ? Arthropathy, multiple sites    ? Benign non-nodular prostatic hyperplasia without lower urinary tract symptoms    ? Chronic indwelling Rivera catheter    ? Depressive disorder    ? Dysphagia    ? FTT (failure to thrive) in adult    ? Gout    ? Hearing loss    ? Onychomycosis    ? PAD (peripheral artery disease) (H)    ? Penile erosion     d/t rivera cath     Past Surgical History:   Procedure Laterality Date   ? CERVICAL LAMINECTOMY     ? LUMBAR LAMINECTOMY     ? [facility] REMOVE LENS MATERIAL PHACOFRAGMT  6/1/06, 6/15/06     Family History   Problem Relation Age of Onset   ? Stroke Mother    ? Lung cancer Sister    ? Stroke Brother    ? Brain cancer Sister      Social History     Socioeconomic History   ? Marital status:      Spouse name: Not on file   ? Number of children: Not on file   ? Years of education: Not  on file   ? Highest education level: Not on file   Occupational History   ? Not on file   Social Needs   ? Financial resource strain: Not on file   ? Food insecurity:     Worry: Not on file     Inability: Not on file   ? Transportation needs:     Medical: Not on file     Non-medical: Not on file   Tobacco Use   ? Smoking status: Former Smoker     Packs/day: 1.00     Years: 10.00     Pack years: 10.00     Types: Cigarettes     Last attempt to quit: 5/10/1963     Years since quittin.8   ? Smokeless tobacco: Never Used   Substance and Sexual Activity   ? Alcohol use: Yes     Alcohol/week: 4.2 oz     Types: 7 Cans of beer per week     Comment: 1 beer per day   ? Drug use: Not on file   ? Sexual activity: Not on file   Lifestyle   ? Physical activity:     Days per week: Not on file     Minutes per session: Not on file   ? Stress: Not on file   Relationships   ? Social connections:     Talks on phone: Not on file     Gets together: Not on file     Attends [employer] service: Not on file     Active member of club or organization: Not on file     Attends meetings of clubs or organizations: Not on file     Relationship status: Not on file   ? Intimate partner violence:     Fear of current or ex partner: Not on file     Emotionally abused: Not on file     Physically abused: Not on file     Forced sexual activity: Not on file   Other Topics Concern   ? Not on file   Social History Narrative   ? Not on file     No Known Allergies     Current Outpatient Medications   Medication Sig Dispense Refill   ? acetaminophen (TYLENOL) 500 MG tablet Take 1,000 mg by mouth daily. 1h prior to dressing changes, and two times a day prn at least 4h between doses           ? miconazole (MICOTIN) 2 % cream Apply 1 application topically 2 (two) times a day. Apply to penile erosion     ? multivitamin with minerals tablet Take 1 tablet by mouth daily.     ? tamsulosin (FLOMAX) 0.4 mg cap Take 0.4 mg by mouth Daily after breakfast.       No  current facility-administered medications for this visit.      REVIEW OF SYSTEMS:    Currently, no fever, chills, or rigors. Does not have any visual or hearing problems. Denies any chest pain, headaches, palpitations, lightheadedness, dizziness, shortness of breath, or cough. Appetite is good. Denies any GERD symptoms. Denies any difficulty with swallowing, nausea, or vomiting.  Denies any abdominal pain, diarrhea or constipation. Urinary retention with suprapubic catheter. No insomnia. No active bleeding. No rash.     PHYSICAL EXAMINATION:  Vitals:    03/14/19 2039   BP: 118/55   Pulse: 67   Resp: 16   Temp: 96.6  F (35.9  C)   SpO2: 96%   Weight: 141 lb 4.8 oz (64.1 kg)       GENERAL: Awake, Alert, oriented x3, not in any form of acute distress, answers questions appropriately, follows simple commands, conversant  HEENT: Head is normocephalic with normal hair distribution. No evidence of trauma. Ears: No acute purulent discharge. Eyes: Conjunctivae pink with no scleral jaundice. Nose: Normal mucosa and septum. NECK: Supple with no cervical or supraclavicular lymphadenopathy. Trachea is midline.   CHEST: No tenderness or deformity, no crepitus  LUNG: Clear to auscultation with good chest expansion. There are no crackles or wheezes, normal AP diameter.  BACK: No kyphosis of the thoracic spine. Symmetric, no curvature, ROM normal, no CVA tenderness, no spinal tenderness   CVS: There is good S1  S2, there are no murmurs, rubs, gallops, or heaves, rhythm is regular,  2+ pulses symmetric in all extremities.  ABDOMEN: Globular and soft, nontender to palpation, non distended, no masses, no organomegaly, good bowel sounds, no rebound or guarding, no peritoneal signs.   EXTREMITIES:  Full range of motion on both upper and lower extremities, there is no tenderness to palpation, no pedal edema, no cyanosis or clubbing, no calf tenderness.  Pulses equal in all extremities, normal cap refill, no joint swelling.  SKIN: Warm and  dry, no erythema noted.  Skin color, texture, no rashes or lesions.  NEUROLOGICAL: The patient is oriented to person, place and time. Strength and sensation are grossly intact. Face is symmetric.    LABS:  All labs reviewed in the nursing home record.    ASSESSMENT/PLAN:    1. Benign prostatic hyperplasia with urinary retention - Continue Flomax   2. Pain management - Continue Tylenol 1000 mg ed   3. Suprapubic catheter (H) - Due to urinary retention   4. Generalized weakness - Continue PT/OT                   Electronically signed by:  John Oneil CNP

## 2021-06-19 NOTE — LETTER
Letter by John Oneil CNP at      Author: John Oneil CNP Service: -- Author Type: --    Filed:  Encounter Date: 3/21/2019 Status: (Other)         Patient: Ronnie Stein   MR Number: [id number]   YOB: 1924   Date of Visit: 3/21/2019          [facility]    NAME:  Ronnie Stein             :  1924  MRN: [id number]  CODE STATUS:  DNR/DNI    FACILITY:  [facility] [[id number]]       ROOM:   814    CHIEF COMPLAINT/REASON FOR VISIT:  Chief Complaint   Patient presents with   ? Problem Visit     Decreased ROM and Urinary retention     HISTORY OF PRESENT ILLNESS: Ronnie Stein is a 89 y.o. male with Peripheral artery disease and Chronic indwelling rivera catheter, history of recurrent falls, wheelchair bound since admission in 2018 who was admitted on 2019 for failure to thrive, dehydration/malnutrition and recurrent falls. He presented to ER for evaluation following a fall and dizziness. No LOC.     On admission, he was noted to have multiple wounds feet, elbow and failure to thrive with prealbumin 7. Speech therapist following, concerned for aspiration. Now on dysphagia diet which puts him at higher risk of dehydration. He was treated with IVF on admission for dehydration but developed hypoxemia due to volume overload. IVF stopped. Dr. Tam discussed with family that hospice might be a better option and hospice consulted. Hospice Liaison met with patient and daughter on  and felt he was not hospice appropriate at the time. Patient at high risk for aspiration and also at very high risk for dehydration and acute kidney injury given poor oral intake. He continues to have poor oral intake and losing weight since admission. Yesterday(), Hospice Liaison reviewed patient's chart and they now agree that he is appropriate for hospice. Discussed with patient's daughter  who thinks patient is clinically  doing little better and his confusion resolved. She would like to continue current management for now and is not ready for hospice or comfort care at this time. Patient had been little confused early this week which was likely due to UTI. Currently on antibiotic for UTI, his confusion much improved and daughter would like to see if he can get stronger again. She would like to pursue TCU or long term placement at this time but knows that hospice is an option if he fails or worsening.  Repeat CXR 2/14/19 showed improved finding. He continues to require some supplemental oxygen mostly with sleeping.       Patient was also seen by Urology for rivera related penile erosion, recommended suprapubic tube placement which was placed on 2/12/19 by IR. He was noted to have E. Coli UTI now on oral antibiotic.       ASSESSMENT and PLAN      Principal Problem:  Recurrent Falls  Failure to thrive   Dysphagia     Patient with history of recurrent falls, admitted with fall and dizziness. Suspected etiology include dehydration, malnutrition.     Continue therapies.     Continue dysphagia diet per speech therapies. Aspiration precautions    High risk for dehydration and acute kidney injury.     Ensure supplement (he does not like the taste)    Discussed with nursing to assist patient at all times with feeding. Please keep recording his oral intake and encourage PO intake    Wound care following for multiple wounds    Hospice liaison met with patient and daughter 2/12/2019 - felt not hospice appropriate at the time. Now with poor oral intake and weight loss since then, he is hospice appropriate per hospice liaison. Discussed with daughter yesterday (2/14/19) who is not ready for hospice or comfort care at this time. But she understands that it is an option if he fails and/or they change their mind.   -Consider palliative care consult     Penile erosion     Chronic indwelling Rivera catheter  E. 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 Will need to be changed every 4 wks    Continue Keflex to complete the course.   Active Problems:  Cough, hypoxemia: fluid overload on CXR 2/12. Also at high risk for aspiration    IVF stopped 2/12 given CXR findings with pulmonary venous congestion. He was weaned off oxygen, but now back on 2L oxygen overnight 2/13-2/14.     Obtain CXR today.     Oxygen as needed.     Aspiration precautions    Bronchodilator as needed.     If worsened, consider CT chest for further evaluation   Benign non-nodular prostatic hyperplasia without lower urinary tract symptoms    On Flomax    Urology following, appreciated    ACP (advance care planning)    DNR.     Consider hospice as outpatient  Patient was stabilized and transferred to TCU for continued rehabilitation.    Today, patient is seen at the bedside.  Denies any uncontrolled pain.  Reports decreased ROM of bilateral shoulders.  He has bilateral lower extremity edema.  He continues to work with therapies.      Past Medical History:   Diagnosis Date   ? Arthropathy, multiple sites    ? Benign non-nodular prostatic hyperplasia without lower urinary tract symptoms    ? Chronic indwelling Rivera catheter    ? Depressive disorder    ? Dysphagia    ? FTT (failure to thrive) in adult    ? Gout    ? Hearing loss    ? Onychomycosis    ? PAD (peripheral artery disease) (H)    ? Penile erosion     d/t rivera cath     Past Surgical History:   Procedure Laterality Date   ? CERVICAL LAMINECTOMY     ? LUMBAR LAMINECTOMY     ? [place] REMOVE LENS MATERIAL PHACOFRAGMT  6/1/06, 6/15/06     Family History   Problem Relation Age of Onset   ? Stroke Mother    ? Lung cancer Sister    ? Stroke Brother    ? Brain cancer Sister      Social History     Socioeconomic History   ? Marital status:       Spouse name: Not on file   ? Number of children: Not on file   ? Years of education: Not on file   ? Highest education level: Not on file   Occupational History   ? Not on file   Social Needs   ? Financial resource strain: Not on file   ? Food insecurity:     Worry: Not on file     Inability: Not on file   ? Transportation needs:     Medical: Not on file     Non-medical: Not on file   Tobacco Use   ? Smoking status: Former Smoker     Packs/day: 1.00     Years: 10.00     Pack years: 10.00     Types: Cigarettes     Last attempt to quit: 5/10/1963     Years since quittin.9   ? Smokeless tobacco: Never Used   Substance and Sexual Activity   ? Alcohol use: Yes     Alcohol/week: 4.2 oz     Types: 7 Cans of beer per week     Comment: 1 beer per day   ? Drug use: Not on file   ? Sexual activity: Not on file   Lifestyle   ? Physical activity:     Days per week: Not on file     Minutes per session: Not on file   ? Stress: Not on file   Relationships   ? Social connections:     Talks on phone: Not on file     Gets together: Not on file     Attends [employer] service: Not on file     Active member of club or organization: Not on file     Attends meetings of clubs or organizations: Not on file     Relationship status: Not on file   ? Intimate partner violence:     Fear of current or ex partner: Not on file     Emotionally abused: Not on file     Physically abused: Not on file     Forced sexual activity: Not on file   Other Topics Concern   ? Not on file   Social History Narrative   ? Not on file     No Known Allergies     Current Outpatient Medications   Medication Sig Dispense Refill   ? acetaminophen (TYLENOL) 500 MG tablet Take 1,000 mg by mouth daily. 1h prior to dressing changes, and two times a day prn at least 4h between doses           ? miconazole (MICOTIN) 2 % cream Apply 1 application topically 2 (two) times a day. Apply to penile erosion     ? multivitamin with minerals tablet Take 1 tablet by mouth daily.     ?  tamsulosin (FLOMAX) 0.4 mg cap Take 0.4 mg by mouth Daily after breakfast.       No current facility-administered medications for this visit.      REVIEW OF SYSTEMS:    Currently, no fever, chills, or rigors. Does not have any visual or hearing problems. Denies any chest pain, headaches, palpitations, lightheadedness, dizziness, shortness of breath, or cough. Appetite is good. Denies any GERD symptoms. Denies any difficulty with swallowing, nausea, or vomiting.  Denies any abdominal pain, diarrhea or constipation. Urinary retention with suprapubic catheter. No insomnia. No active bleeding. No rash.     PHYSICAL EXAMINATION:  Vitals:    03/21/19 1147   BP: 127/60   Pulse: 71   Resp: 16   Temp: 97.6  F (36.4  C)   SpO2: 96%   Weight: 141 lb 4.8 oz (64.1 kg)       GENERAL: Awake, Alert, oriented x3, not in any form of acute distress, answers questions appropriately, follows simple commands, conversant  HEENT: Head is normocephalic with normal hair distribution. No evidence of trauma. Ears: No acute purulent discharge. Eyes: Conjunctivae pink with no scleral jaundice. Nose: Normal mucosa and septum. NECK: Supple with no cervical or supraclavicular lymphadenopathy. Trachea is midline.   CHEST: No tenderness or deformity, no crepitus  LUNG: Clear to auscultation with good chest expansion. There are no crackles or wheezes, normal AP diameter.  BACK: No kyphosis of the thoracic spine. Symmetric, no curvature, ROM normal, no CVA tenderness, no spinal tenderness   CVS: There is good S1  S2, there are no murmurs, rubs, gallops, or heaves, rhythm is regular,  2+ pulses symmetric in all extremities.  ABDOMEN: Globular and soft, nontender to palpation, non distended, no masses, no organomegaly, good bowel sounds, no rebound or guarding, no peritoneal signs.   EXTREMITIES:  Decreased range of motion on both upper and lower extremities, there is no tenderness to palpation, bilateral lower extremity edema, no cyanosis or clubbing,  no calf tenderness.  Pulses equal in all extremities, normal cap refill, no joint swelling.  SKIN: Warm and dry, no erythema noted.  Skin color, texture, no rashes or lesions.  NEUROLOGICAL: The patient is oriented to person, place and time. Strength and sensation are grossly intact. Face is symmetric.    LABS:  All labs reviewed in the nursing home record.    ASSESSMENT/PLAN:    1. Generalized weakness - Wheelchair bound, continues to work with PT/OT   2. Frequent falls - No recent falls   3. Benign prostatic hyperplasia with urinary retention - s/p suprapubic catheter, continue Flomax   4. Suprapubic catheter (H) - See above                         Electronically signed by:  John Oneil CNP

## 2021-06-19 NOTE — LETTER
Letter by Bayron Hall MD at      Author: Bayron Hall MD Service: -- Author Type: --    Filed:  Encounter Date: 3/11/2019 Status: (Other)         Patient: Ronnie Stein   MR Number: [id number]   YOB: 1924   Date of Visit: 3/11/2019     [facility]    Facility:   [facility] [[id number]]   Code Status: DNR/DNI      CHIEF COMPLAINT/REASON FOR VISIT:  Chief Complaint   Patient presents with   ? Review Of Multiple Medical Conditions       HISTORY:      HPI: Ronnie is a 89 y.o. male who was admitted to the hospital due to frequent falls and failure to thrive.  He had progressive weakness and now required wheelchair.  He had BPH with urinary retention requiring chronic indwelling Bar catheter.  While he was hospitalized it was recommended that he have a suprapubic catheter placed since there was erosion of the penis from the indwelling Bar catheter.  This procedure was done.  It was determined that he was at risk for aspiration pneumonia and restrictions of diet were imposed.  At the same time it was determined that he is at risk for dehydration and he required IV fluids.  However he became fluid overloaded.  It was at this point that discussion of hospice consultation was done and this occurred, but there were signs of improvement of his condition with some clearing of confusion with the treatment of the UTI and appearance of some improvement of overall condition, such that his daughter wanted to try transitional care unit for strengthening and potential for long-term care placement knowing that hospice is always an option in the future.    Upon current review of systems he states that he is feeling fine and has no new problems.  He is not having fevers or chills or cough or shortness of breath or chest pain or palpitations of the heart or abdominal pain or nausea.  Nursing staff notes that he did have a fall over the weekend and so he has been on every  hour nighttime bed checks.  Therapy notes that his CPT score is 4.8.    Past Medical History:   Diagnosis Date   ? Arthropathy, multiple sites    ? Benign non-nodular prostatic hyperplasia without lower urinary tract symptoms    ? Chronic indwelling Rivera catheter    ? Depressive disorder    ? Dysphagia    ? FTT (failure to thrive) in adult    ? Gout    ? Hearing loss    ? Onychomycosis    ? PAD (peripheral artery disease) (H)    ? Penile erosion     d/t rivera cath             Family History   Problem Relation Age of Onset   ? Stroke Mother    ? Lung cancer Sister    ? Stroke Brother    ? Brain cancer Sister      Social History     Socioeconomic History   ? Marital status:      Spouse name: Not on file   ? Number of children: Not on file   ? Years of education: Not on file   ? Highest education level: Not on file   Occupational History   ? Not on file   Social Needs   ? Financial resource strain: Not on file   ? Food insecurity:     Worry: Not on file     Inability: Not on file   ? Transportation needs:     Medical: Not on file     Non-medical: Not on file   Tobacco Use   ? Smoking status: Former Smoker     Packs/day: 1.00     Years: 10.00     Pack years: 10.00     Types: Cigarettes     Last attempt to quit: 5/10/1963     Years since quittin.8   ? Smokeless tobacco: Never Used   Substance and Sexual Activity   ? Alcohol use: Yes     Alcohol/week: 4.2 oz     Types: 7 Cans of beer per week     Comment: 1 beer per day   ? Drug use: Not on file   ? Sexual activity: Not on file   Lifestyle   ? Physical activity:     Days per week: Not on file     Minutes per session: Not on file   ? Stress: Not on file   Relationships   ? Social connections:     Talks on phone: Not on file     Gets together: Not on file     Attends Congregation service: Not on file     Active member of club or organization: Not on file     Attends meetings of clubs or organizations: Not on file     Relationship status: Not on file   ? Intimate  partner violence:     Fear of current or ex partner: Not on file     Emotionally abused: Not on file     Physically abused: Not on file     Forced sexual activity: Not on file   Other Topics Concern   ? Not on file   Social History Narrative   ? Not on file         Review of Systems    .  Vitals:    03/11/19 1650   BP: 118/58   Pulse: 74   Resp: 12   Temp: 97.1  F (36.2  C)   SpO2: 96%       Physical Exam   Constitutional: No distress.   HENT:   Nose: Nose normal.   Eyes: Right eye exhibits no discharge. Left eye exhibits no discharge.   Neck: No JVD present.   Cardiovascular: Normal heart sounds.   Pulmonary/Chest: Breath sounds normal. No respiratory distress.   Abdominal: Soft. Bowel sounds are normal. He exhibits no distension. There is no tenderness.   Genitourinary:   Genitourinary Comments: Suprapubic catheter in place   Musculoskeletal: He exhibits no edema.   Neurological: He is alert.   Psychiatric: He has a normal mood and affect.   Nursing note and vitals reviewed.        LABS:   No new laboratory testing    ASSESSMENT:      ICD-10-CM    1. Generalized weakness R53.1    2. FTT (failure to thrive) in adult R62.7    3. Benign prostatic hyperplasia with urinary retention N40.1     R33.8    4. Suprapubic catheter (H) Z93.59    5. Frequent falls R29.6        PLAN:    Continue with current plans.      Electronically signed by: Bayron Hall MD

## 2021-06-19 NOTE — LETTER
Letter by John Oneil CNP at      Author: John Oneil CNP Service: -- Author Type: --    Filed:  Encounter Date: 3/28/2019 Status: (Other)         Patient: Ronnie Stein   MR Number: [id number]   YOB: 1924   Date of Visit: 3/28/2019          [facility]    NAME:  Ronnie Stein             :  1924  MRN: [id number]  CODE STATUS:  DNR/DNI    FACILITY:  [facility] [[id number]]       ROOM:   814    CHIEF COMPLAINT/REASON FOR VISIT:  Chief Complaint   Patient presents with   ? Problem Visit     Bilateral lower extremity edema and Urinary retention     HISTORY OF PRESENT ILLNESS: Ronnie Stein is a 89 y.o. male with Peripheral artery disease and Chronic indwelling rivera catheter, history of recurrent falls, wheelchair bound since admission in 2018 who was admitted on 2019 for failure to thrive, dehydration/malnutrition and recurrent falls. He presented to ER for evaluation following a fall and dizziness. No LOC.     On admission, he was noted to have multiple wounds feet, elbow and failure to thrive with prealbumin 7. Speech therapist following, concerned for aspiration. Now on dysphagia diet which puts him at higher risk of dehydration. He was treated with IVF on admission for dehydration but developed hypoxemia due to volume overload. IVF stopped. Dr. Tam discussed with family that hospice might be a better option and hospice consulted. Hospice Liaison met with patient and daughter on  and felt he was not hospice appropriate at the time. Patient at high risk for aspiration and also at very high risk for dehydration and acute kidney injury given poor oral intake. He continues to have poor oral intake and losing weight since admission. Yesterday(), Hospice Liaison reviewed patient's chart and they now agree that he is appropriate for hospice. Discussed with patient's daughter  who thinks patient  is clinically doing little better and his confusion resolved. She would like to continue current management for now and is not ready for hospice or comfort care at this time. Patient had been little confused early this week which was likely due to UTI. Currently on antibiotic for UTI, his confusion much improved and daughter would like to see if he can get stronger again. She would like to pursue TCU or long term placement at this time but knows that hospice is an option if he fails or worsening.  Repeat CXR 2/14/19 showed improved finding. He continues to require some supplemental oxygen mostly with sleeping.       Patient was also seen by Urology for rivera related penile erosion, recommended suprapubic tube placement which was placed on 2/12/19 by IR. He was noted to have E. Coli UTI now on oral antibiotic.       ASSESSMENT and PLAN      Principal Problem:  Recurrent Falls  Failure to thrive   Dysphagia     Patient with history of recurrent falls, admitted with fall and dizziness. Suspected etiology include dehydration, malnutrition.     Continue therapies.     Continue dysphagia diet per speech therapies. Aspiration precautions    High risk for dehydration and acute kidney injury.     Ensure supplement (he does not like the taste)    Discussed with nursing to assist patient at all times with feeding. Please keep recording his oral intake and encourage PO intake    Wound care following for multiple wounds    Hospice liaison met with patient and daughter 2/12/2019 - felt not hospice appropriate at the time. Now with poor oral intake and weight loss since then, he is hospice appropriate per hospice liaison. Discussed with daughter yesterday (2/14/19) who is not ready for hospice or comfort care at this time. But she understands that it is an option if he fails and/or they change their mind.   -Consider palliative care consult     Penile erosion     Chronic indwelling Rivera catheter  E. Coli UTI: suspect true  infection given improvement in his confusion with treatment,     Noted to have penile erosion likely secondary to rivera ,urology consulted and recommended suprapubic catheter placement which was completed 2/12/2019 by IR. Will need to be changed every 4 wks    Continue Keflex to complete the course.   Active Problems:  Cough, hypoxemia: fluid overload on CXR 2/12. Also at high risk for aspiration    IVF stopped 2/12 given CXR findings with pulmonary venous congestion. He was weaned off oxygen, but now back on 2L oxygen overnight 2/13-2/14.     Obtain CXR today.     Oxygen as needed.     Aspiration precautions    Bronchodilator as needed.     If worsened, consider CT chest for further evaluation   Benign non-nodular prostatic hyperplasia without lower urinary tract symptoms    On Flomax    Urology following, appreciated    ACP (advance care planning)    DNR.     Consider hospice as outpatient  Patient was stabilized and transferred to TCU for continued rehabilitation.    Today, patient is seen at the bedside.  He has bilateral lower extremity edema.  He has urinary retention with a suprapubic catheter. Followed by Urology.        Past Medical History:   Diagnosis Date   ? Arthropathy, multiple sites    ? Benign non-nodular prostatic hyperplasia without lower urinary tract symptoms    ? Chronic indwelling Rivera catheter    ? Depressive disorder    ? Dysphagia    ? FTT (failure to thrive) in adult    ? Gout    ? Hearing loss    ? Onychomycosis    ? PAD (peripheral artery disease) (H)    ? Penile erosion     d/t rivera cath     Past Surgical History:   Procedure Laterality Date   ? CERVICAL LAMINECTOMY     ? LUMBAR LAMINECTOMY     ? TN REMOVE LENS MATERIAL PHACOFRAGMT  6/1/06, 6/15/06     Family History   Problem Relation Age of Onset   ? Stroke Mother    ? Lung cancer Sister    ? Stroke Brother    ? Brain cancer Sister      Social History     Socioeconomic History   ? Marital status:      Spouse name: Not on file    ? Number of children: Not on file   ? Years of education: Not on file   ? Highest education level: Not on file   Occupational History   ? Not on file   Social Needs   ? Financial resource strain: Not on file   ? Food insecurity:     Worry: Not on file     Inability: Not on file   ? Transportation needs:     Medical: Not on file     Non-medical: Not on file   Tobacco Use   ? Smoking status: Former Smoker     Packs/day: 1.00     Years: 10.00     Pack years: 10.00     Types: Cigarettes     Last attempt to quit: 5/10/1963     Years since quittin.9   ? Smokeless tobacco: Never Used   Substance and Sexual Activity   ? Alcohol use: Yes     Alcohol/week: 4.2 oz     Types: 7 Cans of beer per week     Comment: 1 beer per day   ? Drug use: Not on file   ? Sexual activity: Not on file   Lifestyle   ? Physical activity:     Days per week: Not on file     Minutes per session: Not on file   ? Stress: Not on file   Relationships   ? Social connections:     Talks on phone: Not on file     Gets together: Not on file     Attends Christianity service: Not on file     Active member of club or organization: Not on file     Attends meetings of clubs or organizations: Not on file     Relationship status: Not on file   ? Intimate partner violence:     Fear of current or ex partner: Not on file     Emotionally abused: Not on file     Physically abused: Not on file     Forced sexual activity: Not on file   Other Topics Concern   ? Not on file   Social History Narrative   ? Not on file     No Known Allergies     Current Outpatient Medications   Medication Sig Dispense Refill   ? acetaminophen (TYLENOL) 500 MG tablet Take 1,000 mg by mouth daily. 1h prior to dressing changes, and two times a day prn at least 4h between doses           ? miconazole (MICOTIN) 2 % cream Apply 1 application topically 2 (two) times a day. Apply to penile erosion     ? multivitamin with minerals tablet Take 1 tablet by mouth daily.     ? tamsulosin (FLOMAX) 0.4  mg cap Take 0.4 mg by mouth Daily after breakfast.       No current facility-administered medications for this visit.      REVIEW OF SYSTEMS:    Currently, no fever, chills, or rigors. Does not have any visual or hearing problems. Denies any chest pain, headaches, palpitations, lightheadedness, dizziness, shortness of breath, or cough. Appetite is good. Denies any GERD symptoms. Denies any difficulty with swallowing, nausea, or vomiting.  Denies any abdominal pain, diarrhea or constipation. Urinary retention with suprapubic catheter. No insomnia. No active bleeding. No rash.     PHYSICAL EXAMINATION:  Vitals:    03/28/19 0953   BP: (!) 82/47   Pulse: 81   Resp: 16   Temp: 97.1  F (36.2  C)   SpO2: 95%   Weight: 145 lb 9.6 oz (66 kg)       GENERAL: Awake, Alert, oriented x3, not in any form of acute distress, answers questions appropriately, follows simple commands, conversant  HEENT: Head is normocephalic with normal hair distribution. No evidence of trauma. Ears: No acute purulent discharge. Eyes: Conjunctivae pink with no scleral jaundice. Nose: Normal mucosa and septum. NECK: Supple with no cervical or supraclavicular lymphadenopathy. Trachea is midline.   CHEST: No tenderness or deformity, no crepitus  LUNG: Clear to auscultation with good chest expansion. There are no crackles or wheezes, normal AP diameter.  BACK: No kyphosis of the thoracic spine. Symmetric, no curvature, ROM normal, no CVA tenderness, no spinal tenderness   CVS: There is good S1  S2, there are no murmurs, rubs, gallops, or heaves, rhythm is regular,  2+ pulses symmetric in all extremities.  ABDOMEN: Globular and soft, nontender to palpation, non distended, no masses, no organomegaly, good bowel sounds, no rebound or guarding, no peritoneal signs.   EXTREMITIES:  Decreased range of motion on both upper and lower extremities, there is no tenderness to palpation, bilateral lower extremity edema, no cyanosis or clubbing, no calf tenderness.   Pulses equal in all extremities, normal cap refill, no joint swelling.  SKIN: Warm and dry, no erythema noted.  Skin color, texture, no rashes or lesions.  NEUROLOGICAL: The patient is oriented to person, place and time. Strength and sensation are grossly intact. Face is symmetric.    LABS:  All labs reviewed in the nursing home record.    ASSESSMENT/PLAN:    1. Urinary retention - Continue Flomax and suprapubic catheter.  Followed by Urology   2. Bilateral lower extremity edema - Worsening, will start ROSEMARY hose - on in morning and off on evening   3. Suprapubic catheter (H) - See above   4. Benign prostatic hyperplasia with urinary retention - Continue Flomax                               Electronically signed by:  John Oneil CNP

## 2021-06-19 NOTE — LETTER
Letter by John Oneil CNP at      Author: John Oneil CNP Service: -- Author Type: --    Filed:  Encounter Date: 2019 Status: (Other)         Patient: Ronnie Stein   MR Number: [id number]   YOB: 1924   Date of Visit: 2019          [facility]    NAME:  Ronnie Stein             :  1924  MRN: [id number]  CODE STATUS:  DNR/DNI    VISIT TYPE: DISCHARGE SUMMARY  FACILYTY: [facility] [[id number]]                    PRIMARY CARE PROVIDER: Matthieu Finley MD    DISCHARGE DIAGNOSIS:      1. Frequent falls    2. Generalized weakness    3. Urinary retention    4. Bilateral lower extremity edema    5. PAD (peripheral artery disease) (H)    6. Benign prostatic hyperplasia with urinary retention    7. Suprapubic catheter (H)         DISCHARGE MEDICATIONS:      Current Outpatient Medications   Medication Sig Dispense Refill   ? acetaminophen (TYLENOL) 500 MG tablet Take 1,000 mg by mouth daily. 1h prior to dressing changes, and two times a day prn at least 4h between doses           ? miconazole (MICOTIN) 2 % cream Apply 1 application topically 2 (two) times a day. Apply to penile erosion     ? multivitamin with minerals tablet Take 1 tablet by mouth daily.     ? mupirocin (BACTROBAN) 2 % ointment Apply 1 application topically 3 (three) times a day.       No current facility-administered medications for this visit.        HISTORY OF PRESENT ILLNESS: Ronnie Stein is a 89 y.o. male with Peripheral artery disease and Chronic indwelling rivera catheter, history of recurrent falls, wheelchair bound since admission in 2018 who was admitted on 2019 for failure to thrive, dehydration/malnutrition and recurrent falls. He presented to ER for evaluation following a fall and dizziness. No LOC.     On admission, he was noted to have multiple wounds feet, elbow and failure to thrive with prealbumin 7. Speech therapist  following, concerned for aspiration. Now on dysphagia diet which puts him at higher risk of dehydration. He was treated with IVF on admission for dehydration but developed hypoxemia due to volume overload. IVF stopped. Dr. Tam discussed with family that hospice might be a better option and hospice consulted. Hospice Liaison met with patient and daughter on 2/12 and felt he was not hospice appropriate at the time. Patient at high risk for aspiration and also at very high risk for dehydration and acute kidney injury given poor oral intake. He continues to have poor oral intake and losing weight since admission. Yesterday(2/14), Hospice Liaison reviewed patient's chart and they now agree that he is appropriate for hospice. Discussed with patient's daughter 2/14 who thinks patient is clinically doing little better and his confusion resolved. She would like to continue current management for now and is not ready for hospice or comfort care at this time. Patient had been little confused early this week which was likely due to UTI. Currently on antibiotic for UTI, his confusion much improved and daughter would like to see if he can get stronger again. She would like to pursue TCU or long term placement at this time but knows that hospice is an option if he fails or worsening.  Repeat CXR 2/14/19 showed improved finding. He continues to require some supplemental oxygen mostly with sleeping.       Patient was also seen by Urology for rivera related penile erosion, recommended suprapubic tube placement which was placed on 2/12/19 by IR. He was noted to have E. Coli UTI now on oral antibiotic.       ASSESSMENT and PLAN      Principal Problem:  Recurrent Falls  Failure to thrive   Dysphagia     Patient with history of recurrent falls, admitted with fall and dizziness. Suspected etiology include dehydration, malnutrition.     Continue therapies.     Continue dysphagia diet per speech therapies. Aspiration precautions    High  risk for dehydration and acute kidney injury.     Ensure supplement (he does not like the taste)    Discussed with nursing to assist patient at all times with feeding. Please keep recording his oral intake and encourage PO intake    Wound care following for multiple wounds    Hospice liaison met with patient and daughter 2/12/2019 - felt not hospice appropriate at the time. Now with poor oral intake and weight loss since then, he is hospice appropriate per hospice liaison. Discussed with daughter yesterday (2/14/19) who is not ready for hospice or comfort care at this time. But she understands that it is an option if he fails and/or they change their mind.   -Consider palliative care consult     Penile erosion     Chronic indwelling Rivera catheter  E. 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 Will need to be changed every 4 wks    Continue Keflex to complete the course.   Active Problems:  Cough, hypoxemia: fluid overload on CXR 2/12. Also at high risk for aspiration    IVF stopped 2/12 given CXR findings with pulmonary venous congestion. He was weaned off oxygen, but now back on 2L oxygen overnight 2/13-2/14.     Obtain CXR today.     Oxygen as needed.     Aspiration precautions    Bronchodilator as needed.     If worsened, consider CT chest for further evaluation   Benign non-nodular prostatic hyperplasia without lower urinary tract symptoms    On Flomax    Urology following, appreciated    ACP (advance care planning)    DNR.     Consider hospice as outpatient  Patient was stabilized and transferred to TCU for continued rehabilitation.    SKILLED NURSING FACILITY COURSE:  During this TCU stay, patient completed all anticipated goals of therapy.  He continues to have generalized weakness and will discharge to an shelter  with PT/OT.  He has BPH with urinary retention and has a suprapubic catheter.  He is followed by Urology. Pain is managed with Tylenol    PHYSICAL EXAMINATION:    Vitals:    04/15/19 2251   BP: 95/53   Pulse: 83   Resp: 16   Temp: 97.1  F (36.2  C)   SpO2: 90%   Weight: 148 lb 12.8 oz (67.5 kg)       GENERAL: Awake, Alert, oriented x3, not in any form of acute distress, answers questions appropriately, follows simple commands, conversant  HEENT: Head is normocephalic with normal hair distribution. No evidence of trauma. Ears: No acute purulent discharge. Eyes: Conjunctivae pink with no scleral jaundice. Nose: Normal mucosa and septum. NECK: Supple with no cervical or supraclavicular lymphadenopathy. Trachea is midline.   CHEST: No tenderness or deformity, no crepitus  LUNG: Clear to auscultation with good chest expansion. There are no crackles or wheezes, normal AP diameter.  BACK: No kyphosis of the thoracic spine. Symmetric, no curvature, ROM normal, no CVA tenderness, no spinal tenderness   CVS: There is good S1  S2, there are no murmurs, rubs, gallops, or heaves, rhythm is regular,  2+ pulses symmetric in all extremities.  ABDOMEN: Globular and soft, nontender to palpation, non distended, no masses, no organomegaly, good bowel sounds, no rebound or guarding, no peritoneal signs.   EXTREMITIES: Decreased  range of motion on both upper and lower extremities, there is no tenderness to palpation, no pedal edema, no cyanosis or clubbing, no calf tenderness.  Pulses equal in all extremities, normal cap refill, no joint swelling.  SKIN: Warm and dry, no erythema noted.  Skin color, texture, no rashes or lesions.  NEUROLOGICAL: The patient is oriented to person, place and time. Strength and sensation are grossly intact. Face is symmetric.    LABS:  All labs reviewed in the nursing home record.    DISCHARGE PLAN:  I certify that this patient is under my care and that I or the nurse practitioner working with me, had a  face-to-face encounter that meets the physician face-to-face encounter requirements with this patient.   Date of Face-to-Face Encounter: 4/11/19    This patient is homebound because: The patient cannot leave home without considerable and taxing effort. Patient has FTT, generalized weakness, and Urinary retention and requires the aid of a wheelchair and needs the assistance of another person,     I certify that, based on my findings, the following services are medically necessary home health services: PT, OT, and RN    My clinical findings support the need for the above skilled services because:   Patient to be followed by home care for physical therapy to eval and treat for strengthening, balance, endurance, and safety with mobility, and ambulation.  Patient to be followed by home care for occupational therapy to eval and treat for strengthening, ADL needs, adaptive equipment, and safety.  Patient to be followed by home care for nursing services for medication set up and teaching, symptom and disease processes monitoring and education.      The patient is, or has been, under my care and I have initiated the establishment of the plan of care. This patient will be followed by a physician who will periodically review the plan of care.  Planned discharge.  All therapy goals have been met.  Family will assist with discharge and transportation.  Patient will follow up with PCP within 7-10 days after discharge for medication mangagment and appropriate lab studies.          Electronically signed by:  John Oneil CNP      For documentation purposes, chart review, medication management, and discharge coordination of care was greater than 35 minutes

## 2021-06-24 NOTE — PROGRESS NOTES
[facility]    Facility:   [facility] [[id number]]   Code Status: DNR/DNI      CHIEF COMPLAINT/REASON FOR VISIT:  Chief Complaint   Patient presents with     Review Of Multiple Medical Conditions       HISTORY:      HPI: Ronnie is a 89 y.o. male who was admitted to the hospital due to frequent falls and failure to thrive.  He had progressive weakness and now required wheelchair.  He had BPH with urinary retention requiring chronic indwelling Bar catheter.  While he was hospitalized it was recommended that he have a suprapubic catheter placed since there was erosion of the penis from the indwelling Bar catheter.  This procedure was done.  It was determined that he was at risk for aspiration pneumonia and restrictions of diet were imposed.  At the same time it was determined that he is at risk for dehydration and he required IV fluids.  However he became fluid overloaded.  It was at this point that discussion of hospice consultation was done and this occurred, but there were signs of improvement of his condition with some clearing of confusion with the treatment of the UTI and appearance of some improvement of overall condition, such that his daughter wanted to try transitional care unit for strengthening and potential for long-term care placement knowing that hospice is always an option in the future.    Upon current review of systems, he is feeling better.  He is not having fevers or chills.  He is not having cough or shortness of breath.  He is not having chest pain or palpitations of the heart.  He is not having abdominal pain or nausea.    Past Medical History:   Diagnosis Date     Arthropathy, multiple sites      Benign non-nodular prostatic hyperplasia without lower urinary tract symptoms      Chronic indwelling Bar catheter      Depressive disorder      Dysphagia      FTT (failure to thrive) in adult      Gout      Hearing loss      Onychomycosis      PAD (peripheral  artery disease) (H)      Penile erosion     d/t rivera cath             Family History   Problem Relation Age of Onset     Stroke Mother      Lung cancer Sister      Stroke Brother      Brain cancer Sister      Social History     Socioeconomic History     Marital status:      Spouse name: Not on file     Number of children: Not on file     Years of education: Not on file     Highest education level: Not on file   Occupational History     Not on file   Social Needs     Financial resource strain: Not on file     Food insecurity:     Worry: Not on file     Inability: Not on file     Transportation needs:     Medical: Not on file     Non-medical: Not on file   Tobacco Use     Smoking status: Former Smoker     Packs/day: 1.00     Years: 10.00     Pack years: 10.00     Types: Cigarettes     Last attempt to quit: 5/10/1963     Years since quittin.8     Smokeless tobacco: Never Used   Substance and Sexual Activity     Alcohol use: Yes     Alcohol/week: 4.2 oz     Types: 7 Cans of beer per week     Comment: 1 beer per day     Drug use: Not on file     Sexual activity: Not on file   Lifestyle     Physical activity:     Days per week: Not on file     Minutes per session: Not on file     Stress: Not on file   Relationships     Social connections:     Talks on phone: Not on file     Gets together: Not on file     Attends Restorationist service: Not on file     Active member of club or organization: Not on file     Attends meetings of clubs or organizations: Not on file     Relationship status: Not on file     Intimate partner violence:     Fear of current or ex partner: Not on file     Emotionally abused: Not on file     Physically abused: Not on file     Forced sexual activity: Not on file   Other Topics Concern     Not on file   Social History Narrative     Not on file         Review of Systems    .  Vitals:    19 1007   BP: 130/58   Pulse: 78   Resp: 19   Temp: 97.7  F (36.5  C)   SpO2: 98%       Physical Exam    Constitutional: No distress.   HENT:   Nose: Nose normal.   Eyes: Right eye exhibits no discharge. Left eye exhibits no discharge.   Neck: No JVD present.   Cardiovascular: Normal heart sounds.   Pulmonary/Chest: Breath sounds normal. No respiratory distress.   Abdominal: Soft. Bowel sounds are normal. He exhibits no distension. There is no tenderness.   Neurological: He is alert.   Conversant   Psychiatric: He has a normal mood and affect.   Nursing note and vitals reviewed.        LABS:   Urinalysis and urine culture were negative.    ASSESSMENT:      ICD-10-CM    1. Frequent falls R29.6    2. Benign prostatic hyperplasia with urinary retention N40.1     R33.8    3. Suprapubic catheter (H) Z93.59    4. Generalized weakness R53.1        PLAN:    Continue with current efforts of strengthening and evaluation of ability for ADLs.  Continue monitoring medical conditions.      Electronically signed by: Bayron Hall MD

## 2021-06-24 NOTE — TELEPHONE ENCOUNTER
[facility] Nurse Triage Telephone Note      Provider: MD Hall Alden  Facility: [facility]    Facility Type: [facility]    Caller: Simon hubbard  Call Back Number:  [phone]    Allergies: Patient has no known allergies.    Reason for call: Abdominal X-rays results= no mass, no dilated bowel loop, no abnormality.     Verbal Order/Direction given by Provider: NNO    Provider giving order: MD Hall Alden    Verbal order given to: Monica Zhao RN

## 2021-06-24 NOTE — PROGRESS NOTES
[facility]    Facility:   [facility] [[id number]]   Code Status: DNR/DNI      CHIEF COMPLAINT/REASON FOR VISIT:  Chief Complaint   Patient presents with     Review Of Multiple Medical Conditions       HISTORY:      HPI: Ronnie is a 89 y.o. male who was admitted to the hospital due to frequent falls and failure to thrive.  He had progressive weakness and now required wheelchair.  He had BPH with urinary retention requiring chronic indwelling Bar catheter.  While he was hospitalized it was recommended that he have a suprapubic catheter placed since there was erosion of the penis from the indwelling Bar catheter.  This procedure was done.  It was determined that he was at risk for aspiration pneumonia and restrictions of diet were imposed.  At the same time it was determined that he is at risk for dehydration and he required IV fluids.  However he became fluid overloaded.  It was at this point that discussion of hospice consultation was done and this occurred, but there were signs of improvement of his condition with some clearing of confusion with the treatment of the UTI and appearance of some improvement of overall condition, such that his daughter wanted to try transitional care unit for strengthening and potential for long-term care placement knowing that hospice is always an option in the future.    Upon current review of systems he states that he is feeling fine and has no new problems.  He is not having fevers or chills or cough or shortness of breath or chest pain or palpitations of the heart or abdominal pain or nausea.  Nursing staff notes that he did have a fall over the weekend and so he has been on every hour nighttime bed checks.  Therapy notes that his CPT score is 4.8.    Past Medical History:   Diagnosis Date     Arthropathy, multiple sites      Benign non-nodular prostatic hyperplasia without lower urinary tract symptoms      Chronic indwelling Bar  catheter      Depressive disorder      Dysphagia      FTT (failure to thrive) in adult      Gout      Hearing loss      Onychomycosis      PAD (peripheral artery disease) (H)      Penile erosion     d/t rivera cath             Family History   Problem Relation Age of Onset     Stroke Mother      Lung cancer Sister      Stroke Brother      Brain cancer Sister      Social History     Socioeconomic History     Marital status:      Spouse name: Not on file     Number of children: Not on file     Years of education: Not on file     Highest education level: Not on file   Occupational History     Not on file   Social Needs     Financial resource strain: Not on file     Food insecurity:     Worry: Not on file     Inability: Not on file     Transportation needs:     Medical: Not on file     Non-medical: Not on file   Tobacco Use     Smoking status: Former Smoker     Packs/day: 1.00     Years: 10.00     Pack years: 10.00     Types: Cigarettes     Last attempt to quit: 5/10/1963     Years since quittin.8     Smokeless tobacco: Never Used   Substance and Sexual Activity     Alcohol use: Yes     Alcohol/week: 4.2 oz     Types: 7 Cans of beer per week     Comment: 1 beer per day     Drug use: Not on file     Sexual activity: Not on file   Lifestyle     Physical activity:     Days per week: Not on file     Minutes per session: Not on file     Stress: Not on file   Relationships     Social connections:     Talks on phone: Not on file     Gets together: Not on file     Attends Christianity service: Not on file     Active member of club or organization: Not on file     Attends meetings of clubs or organizations: Not on file     Relationship status: Not on file     Intimate partner violence:     Fear of current or ex partner: Not on file     Emotionally abused: Not on file     Physically abused: Not on file     Forced sexual activity: Not on file   Other Topics Concern     Not on file   Social History Narrative     Not on file          Review of Systems    .  Vitals:    03/11/19 1650   BP: 118/58   Pulse: 74   Resp: 12   Temp: 97.1  F (36.2  C)   SpO2: 96%       Physical Exam   Constitutional: No distress.   HENT:   Nose: Nose normal.   Eyes: Right eye exhibits no discharge. Left eye exhibits no discharge.   Neck: No JVD present.   Cardiovascular: Normal heart sounds.   Pulmonary/Chest: Breath sounds normal. No respiratory distress.   Abdominal: Soft. Bowel sounds are normal. He exhibits no distension. There is no tenderness.   Genitourinary:   Genitourinary Comments: Suprapubic catheter in place   Musculoskeletal: He exhibits no edema.   Neurological: He is alert.   Psychiatric: He has a normal mood and affect.   Nursing note and vitals reviewed.        LABS:   No new laboratory testing    ASSESSMENT:      ICD-10-CM    1. Generalized weakness R53.1    2. FTT (failure to thrive) in adult R62.7    3. Benign prostatic hyperplasia with urinary retention N40.1     R33.8    4. Suprapubic catheter (H) Z93.59    5. Frequent falls R29.6        PLAN:    Continue with current plans.      Electronically signed by: Bayron Hall MD

## 2021-06-24 NOTE — PROGRESS NOTES
[facility]    NAME:  Ronnie Stein             :  1924  MRN: [id number]  CODE STATUS:  DNR/DNI    FACILITY:  [facility] [[id number]]       ROOM:   [id number]    CHIEF COMPLAINT/REASON FOR VISIT:  Chief Complaint   Patient presents with     Problem Visit     HISTORY OF PRESENT ILLNESS: Ronnie Stein is a 89 y.o. male with Peripheral artery disease and Chronic indwelling rivera catheter, history of recurrent falls, wheelchair bound since admission in 2018 who was admitted on 2019 for failure to thrive, dehydration/malnutrition and recurrent falls. He presented to ER for evaluation following a fall and dizziness. No LOC.     On admission, he was noted to have multiple wounds feet, elbow and failure to thrive with prealbumin 7. Speech therapist following, concerned for aspiration. Now on dysphagia diet which puts him at higher risk of dehydration. He was treated with IVF on admission for dehydration but developed hypoxemia due to volume overload. IVF stopped. Dr. Tam discussed with family that hospice might be a better option and hospice consulted. Hospice Liaison met with patient and daughter on  and felt he was not hospice appropriate at the time. Patient at high risk for aspiration and also at very high risk for dehydration and acute kidney injury given poor oral intake. He continues to have poor oral intake and losing weight since admission. Yesterday(), Hospice Liaison reviewed patient's chart and they now agree that he is appropriate for hospice. Discussed with patient's daughter  who thinks patient is clinically doing little better and his confusion resolved. She would like to continue current management for now and is not ready for hospice or comfort care at this time. Patient had been little confused early this week which was likely due to UTI. Currently on antibiotic for UTI, his confusion much improved and daughter would like to see if he  can get stronger again. She would like to pursue TCU or long term placement at this time but knows that hospice is an option if he fails or worsening.  Repeat CXR 2/14/19 showed improved finding. He continues to require some supplemental oxygen mostly with sleeping.       Patient was also seen by Urology for rivera related penile erosion, recommended suprapubic tube placement which was placed on 2/12/19 by IR. He was noted to have E. Coli UTI now on oral antibiotic.       ASSESSMENT and PLAN      Principal Problem:  Recurrent Falls  Failure to thrive   Dysphagia     Patient with history of recurrent falls, admitted with fall and dizziness. Suspected etiology include dehydration, malnutrition.     Continue therapies.     Continue dysphagia diet per speech therapies. Aspiration precautions    High risk for dehydration and acute kidney injury.     Ensure supplement (he does not like the taste)    Discussed with nursing to assist patient at all times with feeding. Please keep recording his oral intake and encourage PO intake    Wound care following for multiple wounds    Hospice liaison met with patient and daughter 2/12/2019 - felt not hospice appropriate at the time. Now with poor oral intake and weight loss since then, he is hospice appropriate per hospice liaison. Discussed with daughter yesterday (2/14/19) who is not ready for hospice or comfort care at this time. But she understands that it is an option if he fails and/or they change their mind.   -Consider palliative care consult     Penile erosion     Chronic indwelling Rivera catheter  E. 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 Will need to be changed every 4 wks    Continue Keflex to complete the course.   Active Problems:  Cough, hypoxemia: fluid overload on  CXR 2/12. Also at high risk for aspiration    IVF stopped 2/12 given CXR findings with pulmonary venous congestion. He was weaned off oxygen, but now back on 2L oxygen overnight 2/13-2/14.     Obtain CXR today.     Oxygen as needed.     Aspiration precautions    Bronchodilator as needed.     If worsened, consider CT chest for further evaluation   Benign non-nodular prostatic hyperplasia without lower urinary tract symptoms    On Flomax    Urology following, appreciated    ACP (advance care planning)    DNR.     Consider hospice as outpatient  Patient was stabilized and transferred to TCU for continued rehabilitation.    Today, patient is seen at the bedside.  His appetite is poor and he has some weight loss.  He continues to work with therapies.      Past Medical History:   Diagnosis Date     Arthropathy, multiple sites      Benign non-nodular prostatic hyperplasia without lower urinary tract symptoms      Chronic indwelling Rivera catheter      Depressive disorder      Dysphagia      FTT (failure to thrive) in adult      Gout      Hearing loss      Onychomycosis      PAD (peripheral artery disease) (H)      Penile erosion     d/t rivera cath     Past Surgical History:   Procedure Laterality Date     CERVICAL LAMINECTOMY       LUMBAR LAMINECTOMY       [place] REMOVE LENS MATERIAL PHACOFRAGMT  6/1/06, 6/15/06     Family History   Problem Relation Age of Onset     Stroke Mother      Lung cancer Sister      Stroke Brother      Brain cancer Sister      Social History     Socioeconomic History     Marital status:      Spouse name: Not on file     Number of children: Not on file     Years of education: Not on file     Highest education level: Not on file   Occupational History     Not on file   Social Needs     Financial resource strain: Not on file     Food insecurity:     Worry: Not on file     Inability: Not on file     Transportation needs:     Medical: Not on file     Non-medical: Not on file   Tobacco Use      Smoking status: Former Smoker     Packs/day: 1.00     Years: 10.00     Pack years: 10.00     Types: Cigarettes     Last attempt to quit: 5/10/1963     Years since quittin.8     Smokeless tobacco: Never Used   Substance and Sexual Activity     Alcohol use: Yes     Alcohol/week: 4.2 oz     Types: 7 Cans of beer per week     Comment: 1 beer per day     Drug use: Not on file     Sexual activity: Not on file   Lifestyle     Physical activity:     Days per week: Not on file     Minutes per session: Not on file     Stress: Not on file   Relationships     Social connections:     Talks on phone: Not on file     Gets together: Not on file     Attends Sabianism service: Not on file     Active member of club or organization: Not on file     Attends meetings of clubs or organizations: Not on file     Relationship status: Not on file     Intimate partner violence:     Fear of current or ex partner: Not on file     Emotionally abused: Not on file     Physically abused: Not on file     Forced sexual activity: Not on file   Other Topics Concern     Not on file   Social History Narrative     Not on file     No Known Allergies     Current Outpatient Medications   Medication Sig Dispense Refill     acetaminophen (TYLENOL) 500 MG tablet Take 1,000 mg by mouth daily. 1h prior to dressing changes, and two times a day prn at least 4h between doses             miconazole (MICOTIN) 2 % cream Apply 1 application topically 2 (two) times a day. Apply to penile erosion       multivitamin with minerals tablet Take 1 tablet by mouth daily.       tamsulosin (FLOMAX) 0.4 mg cap Take 0.4 mg by mouth Daily after breakfast.       No current facility-administered medications for this visit.      REVIEW OF SYSTEMS:    Currently, no fever, chills, or rigors. Does not have any visual or hearing problems. Denies any chest pain, headaches, palpitations, lightheadedness, dizziness, shortness of breath, or cough. Appetite is good. Denies any GERD  symptoms. Denies any difficulty with swallowing, nausea, or vomiting.  Denies any abdominal pain, diarrhea or constipation. Urinary retention with suprapubic catheter. No insomnia. No active bleeding. No rash.     PHYSICAL EXAMINATION:  Vitals:    03/12/19 0049   BP: 117/66   Pulse: 79   Resp: 17   Temp: (!) 96.4  F (35.8  C)   SpO2: 90%   Weight: 141 lb (64 kg)       GENERAL: Awake, Alert, oriented x3, not in any form of acute distress, answers questions appropriately, follows simple commands, conversant  HEENT: Head is normocephalic with normal hair distribution. No evidence of trauma. Ears: No acute purulent discharge. Eyes: Conjunctivae pink with no scleral jaundice. Nose: Normal mucosa and septum. NECK: Supple with no cervical or supraclavicular lymphadenopathy. Trachea is midline.   CHEST: No tenderness or deformity, no crepitus  LUNG: Clear to auscultation with good chest expansion. There are no crackles or wheezes, normal AP diameter.  BACK: No kyphosis of the thoracic spine. Symmetric, no curvature, ROM normal, no CVA tenderness, no spinal tenderness   CVS: There is good S1  S2, there are no murmurs, rubs, gallops, or heaves, rhythm is regular,  2+ pulses symmetric in all extremities.  ABDOMEN: Globular and soft, nontender to palpation, non distended, no masses, no organomegaly, good bowel sounds, no rebound or guarding, no peritoneal signs.   EXTREMITIES:  Full range of motion on both upper and lower extremities, there is no tenderness to palpation, no pedal edema, no cyanosis or clubbing, no calf tenderness.  Pulses equal in all extremities, normal cap refill, no joint swelling.  SKIN: Warm and dry, no erythema noted.  Skin color, texture, no rashes or lesions.  NEUROLOGICAL: The patient is oriented to person, place and time. Strength and sensation are grossly intact. Face is symmetric.    LABS:  All labs reviewed in the nursing home record.    ASSESSMENT/PLAN:    1. Generalized weakness - Continue PT/OT    2. FTT (failure to thrive) in adult - Progressing, continues to have poor oral intake and weight loss    3. Benign prostatic hyperplasia with urinary retention - Continue Flomax   4. Suprapubic catheter (H) - due penile erosion and urinary retention              Electronically signed by:  John Oneil CNP

## 2021-06-24 NOTE — PROGRESS NOTES
[facility]      Facility:    [facility] [[id number]]  Code Status: DNR      Chief Complaint/Reason for Visit:  Chief Complaint   Patient presents with     H & P       HPI:   Ronnie is a 89 y.o. male who was admitted to the hospital due to frequent falls and failure to thrive.  He had progressive weakness and now required wheelchair.  He had BPH with urinary retention requiring chronic indwelling Bar catheter.  While he was hospitalized it was recommended that he have a suprapubic catheter placed since there was erosion of the penis from the indwelling Bar catheter.  This procedure was done.  It was determined that he was at risk for aspiration pneumonia and restrictions of diet were imposed.  At the same time it was determined that he is at risk for dehydration and he required IV fluids.  However he became fluid overloaded.  It was at this point that discussion of hospice consultation was done and this occurred, but there were signs of improvement of his condition with some clearing of confusion with the treatment of the UTI and appearance of some improvement of overall condition, such that his daughter wanted to try transitional care unit for strengthening and potential for long-term care placement knowing that hospice is always an option in the future.    Upon current review of systems his daughter has concern about potential foreign body since this had been raised by a staff member.  It was for this reason that I was called to the room before I had a chance to review the chart, while nursing and myself were involved in assessing the situation.  There is a desire for water rather than having all liquids thickened out of concern about dehydration.  He is not able to respond meaningfully at the time that I am evaluating, so by review of systems is through his daughter.  There has not been a problem with fevers or chills.  There has not been complaint of cough or shortness  of breath or chest pain or palpitations of the heart or abdominal pain or nausea.  She does have concerns about UTI and how to manage this and how to prevent this from occurring in the future.  There is also concerned about management of skin ulceration around the ankle.    Past Medical History:  Past Medical History:   Diagnosis Date     Arthropathy, multiple sites      Benign non-nodular prostatic hyperplasia without lower urinary tract symptoms      Chronic indwelling Rivera catheter      Depressive disorder      Dysphagia      FTT (failure to thrive) in adult      Gout      Hearing loss      Onychomycosis      PAD (peripheral artery disease) (H)      Penile erosion     d/t rivera cath           Surgical History:  Past Surgical History:   Procedure Laterality Date     CERVICAL LAMINECTOMY       LUMBAR LAMINECTOMY       [place] REMOVE LENS MATERIAL PHACOFRAGMT  06, 6/15/06       Family History:   Family History   Problem Relation Age of Onset     Stroke Mother      Lung cancer Sister      Stroke Brother      Brain cancer Sister        Social History:    Social History     Socioeconomic History     Marital status:      Spouse name: Not on file     Number of children: Not on file     Years of education: Not on file     Highest education level: Not on file   Occupational History     Not on file   Social Needs     Financial resource strain: Not on file     Food insecurity:     Worry: Not on file     Inability: Not on file     Transportation needs:     Medical: Not on file     Non-medical: Not on file   Tobacco Use     Smoking status: Former Smoker     Packs/day: 1.00     Years: 10.00     Pack years: 10.00     Types: Cigarettes     Last attempt to quit: 5/10/1963     Years since quittin.8     Smokeless tobacco: Never Used   Substance and Sexual Activity     Alcohol use: Yes     Alcohol/week: 4.2 oz     Types: 7 Cans of beer per week     Comment: 1 beer per day     Drug use: Not on file     Sexual activity: Not  on file   Lifestyle     Physical activity:     Days per week: Not on file     Minutes per session: Not on file     Stress: Not on file   Relationships     Social connections:     Talks on phone: Not on file     Gets together: Not on file     Attends Jainism service: Not on file     Active member of club or organization: Not on file     Attends meetings of clubs or organizations: Not on file     Relationship status: Not on file     Intimate partner violence:     Fear of current or ex partner: Not on file     Emotionally abused: Not on file     Physically abused: Not on file     Forced sexual activity: Not on file   Other Topics Concern     Not on file   Social History Narrative     Not on file          Review of Systems   All other systems reviewed and are negative.      There were no vitals filed for this visit.    Physical Exam   Constitutional: No distress.   He appears cachectic and dehydrated   HENT:   Dry mucous membranes   Eyes: Right eye exhibits no discharge. Left eye exhibits no discharge.   Neck: No JVD present. No thyromegaly present.   Cardiovascular: Normal heart sounds.   Pulmonary/Chest: Breath sounds normal. No respiratory distress.   Abdominal: Soft. Bowel sounds are normal. He exhibits no distension. There is no tenderness.   Suprapubic catheter present   Musculoskeletal: He exhibits no edema or tenderness.   Lymphadenopathy:     He has no cervical adenopathy.   Neurological:   He is not very responsive in regards to his dehydrated state   Skin:   Ulceration of skin by the ankle area without surrounding warmth or redness and there is no purulent drainage.  Discussion with nursing was done at the time as to continued therapies to this area as is currently being done.  No sign of infection at this point.  This is full-thickness ulceration but not extending into deeper tissues.   Psychiatric:   Difficult to assess due to his neurologic state.   Nursing note and vitals reviewed.      Medication  List:  Current Outpatient Medications   Medication Sig     acetaminophen (TYLENOL) 500 MG tablet Take 1,000 mg by mouth daily. 1h prior to dressing changes, and two times a day prn at least 4h between doses           miconazole (MICOTIN) 2 % cream Apply 1 application topically 2 (two) times a day. Apply to penile erosion     multivitamin with minerals tablet Take 1 tablet by mouth daily.     tamsulosin (FLOMAX) 0.4 mg cap Take 0.4 mg by mouth Daily after breakfast.       Labs:  No new laboratory testing    Assessment:     ICD-10-CM    1. Benign prostatic hyperplasia with urinary retention N40.1     R33.8    2. Frequent falls R29.6    3. Generalized weakness R53.1    4. Suprapubic catheter (H) Z93.59    5. PAD (peripheral artery disease) (H) I73.9        Plan:  I had extensive discussion with his daughter both while in the room during the time of history and physical but also separately to discuss the current situation.  I explained to her that I was not aware of his underlying medical conditions when I entered the room and that he had the appearance of someone who was near death by my first impression.  After discovering all of the situations for him I explained to her that I see no evidence for foreign body, but rather erosion of the undersurface of the penis secondary to the indwelling Bar catheter previously.  At this point I explained there is no sign of infection and that this will heal by itself without any intervention of suturing or other type of procedure and since he now has an indwelling Bar catheter, it is not of concern if this area heals with scar tissue.  I explained in regards to any Bar catheter, that the presence of that catheter increases his risk for urinary tract infection, but the alternative in his situation would be obstructive uropathy with resultant kidney failure.  I also discussed risks versus benefits regarding dehydration versus aspiration pneumonia, and she is in agreement to  allow him to drink water without thickening, but to continue with the other dietary restrictions for all other intake including the thickened liquids.  I encouraged her to be ready for a time when his health will take a turn for the worse again and the value of hospice care to manage comfort cares since that would be the top priority at that time and she is in agreement.      In regards to other management issues at this time I explained that Flomax can be discontinued since he does have the suprapubic catheter.  I am willing to check a urinalysis and urine culture 1 day after completion of antibiotics, but I discussed the concerns about antibiotic resistance that can develop if there is continuous treatment for asymptomatic bacteriuria.  I agreed to having a flat plate x-ray of the abdomen looking for a foreign body in the bladder since I explained to her that there is no way to determine that other than cystoscopy or an x-ray, but that if there is evidence for foreign body, I am not sure that any intervention is required, but if that is so desired, consultation would be needed.        Electronically signed by: Bayron Hall MD

## 2021-06-24 NOTE — TELEPHONE ENCOUNTER
[facility] Nurse Triage Telephone Note      Provider: MD Hall Alden  Facility: [facility]    Facility Type: TCU    Caller: Rachel  Call Back Number:  [phone]    Allergies: Patient has no known allergies.    Reason for call: Pt has dressing changes to his ankles every night and the nursing staff has noticed that he is having pain with the dressing changes and only has the ALICE tylenol.     Verbal Order/Direction given by Provider: tylenol 1000mg 1h prior to dressing changes and 100mg two times a day prn at least 4hrs between doses    Provider giving order: MD Hall Alden    Verbal order given to: Rachel Brantley, BARBARA

## 2021-06-24 NOTE — PROGRESS NOTES
[facility]    NAME:  Ronnie Stein             :  1924  MRN: [id number]  CODE STATUS:  DNR/DNI    FACILITY:  [facility] [[id number]]       ROOM:   [id number]    CHIEF COMPLAINT/REASON FOR VISIT:  Chief Complaint   Patient presents with     Problem Visit     Urinary retention, recurrent falls, Dysphagia     HISTORY OF PRESENT ILLNESS: Ronnie Stein is a 89 y.o. male with Peripheral artery disease and Chronic indwelling rivera catheter, history of recurrent falls, wheelchair bound since admission in 2018 who was admitted on 2019 for failure to thrive, dehydration/malnutrition and recurrent falls. He presented to ER for evaluation following a fall and dizziness. No LOC.     On admission, he was noted to have multiple wounds feet, elbow and failure to thrive with prealbumin 7. Speech therapist following, concerned for aspiration. Now on dysphagia diet which puts him at higher risk of dehydration. He was treated with IVF on admission for dehydration but developed hypoxemia due to volume overload. IVF stopped. Dr. Tam discussed with family that hospice might be a better option and hospice consulted. Hospice Liaison met with patient and daughter on  and felt he was not hospice appropriate at the time. Patient at high risk for aspiration and also at very high risk for dehydration and acute kidney injury given poor oral intake. He continues to have poor oral intake and losing weight since admission. Yesterday(), Hospice Liaison reviewed patient's chart and they now agree that he is appropriate for hospice. Discussed with patient's daughter  who thinks patient is clinically doing little better and his confusion resolved. She would like to continue current management for now and is not ready for hospice or comfort care at this time. Patient had been little confused early this week which was likely due to UTI. Currently on antibiotic for UTI, his confusion  much improved and daughter would like to see if he can get stronger again. She would like to pursue TCU or long term placement at this time but knows that hospice is an option if he fails or worsening.  Repeat CXR 2/14/19 showed improved finding. He continues to require some supplemental oxygen mostly with sleeping.       Patient was also seen by Urology for rivera related penile erosion, recommended suprapubic tube placement which was placed on 2/12/19 by IR. He was noted to have E. Coli UTI now on oral antibiotic.       ASSESSMENT and PLAN      Principal Problem:  Recurrent Falls  Failure to thrive   Dysphagia     Patient with history of recurrent falls, admitted with fall and dizziness. Suspected etiology include dehydration, malnutrition.     Continue therapies.     Continue dysphagia diet per speech therapies. Aspiration precautions    High risk for dehydration and acute kidney injury.     Ensure supplement (he does not like the taste)    Discussed with nursing to assist patient at all times with feeding. Please keep recording his oral intake and encourage PO intake    Wound care following for multiple wounds    Hospice liaison met with patient and daughter 2/12/2019 - felt not hospice appropriate at the time. Now with poor oral intake and weight loss since then, he is hospice appropriate per hospice liaison. Discussed with daughter yesterday (2/14/19) who is not ready for hospice or comfort care at this time. But she understands that it is an option if he fails and/or they change their mind.   -Consider palliative care consult     Penile erosion     Chronic indwelling Rivera catheter  E. 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 Will need to be changed every 4 wks    Continue Keflex to complete the course.    Active Problems:  Cough, hypoxemia: fluid overload on CXR 2/12. Also at high risk for aspiration    IVF stopped 2/12 given CXR findings with pulmonary venous congestion. He was weaned off oxygen, but now back on 2L oxygen overnight 2/13-2/14.     Obtain CXR today.     Oxygen as needed.     Aspiration precautions    Bronchodilator as needed.     If worsened, consider CT chest for further evaluation   Benign non-nodular prostatic hyperplasia without lower urinary tract symptoms    On Flomax    Urology following, appreciated    ACP (advance care planning)    DNR.     Consider hospice as outpatient  Patient was stabilized and transferred to TCU for continued rehabilitation.    Today, patient is seen at the bedside.  He continues to work with ST for dysphagia.  He continues to have generalized weakness and is working with therapies.  He has not had any recent falls although he has a h/o recurrent falls..      Past Medical History:   Diagnosis Date     Arthropathy, multiple sites      Benign non-nodular prostatic hyperplasia without lower urinary tract symptoms      Chronic indwelling Rivera catheter      Depressive disorder      Dysphagia      FTT (failure to thrive) in adult      Gout      Hearing loss      Onychomycosis      PAD (peripheral artery disease) (H)      Penile erosion     d/t rivera cath     Past Surgical History:   Procedure Laterality Date     CERVICAL LAMINECTOMY       LUMBAR LAMINECTOMY       OR REMOVE LENS MATERIAL PHACOFRAGMT  6/1/06, 6/15/06     Family History   Problem Relation Age of Onset     Stroke Mother      Lung cancer Sister      Stroke Brother      Brain cancer Sister      Social History     Socioeconomic History     Marital status:      Spouse name: Not on file     Number of children: Not on file     Years of education: Not on file     Highest education level: Not on file   Occupational History     Not on file   Social Needs     Financial resource strain: Not on file     Food  insecurity:     Worry: Not on file     Inability: Not on file     Transportation needs:     Medical: Not on file     Non-medical: Not on file   Tobacco Use     Smoking status: Former Smoker     Packs/day: 1.00     Years: 10.00     Pack years: 10.00     Types: Cigarettes     Last attempt to quit: 5/10/1963     Years since quittin.8     Smokeless tobacco: Never Used   Substance and Sexual Activity     Alcohol use: Yes     Alcohol/week: 4.2 oz     Types: 7 Cans of beer per week     Comment: 1 beer per day     Drug use: Not on file     Sexual activity: Not on file   Lifestyle     Physical activity:     Days per week: Not on file     Minutes per session: Not on file     Stress: Not on file   Relationships     Social connections:     Talks on phone: Not on file     Gets together: Not on file     Attends Faith service: Not on file     Active member of club or organization: Not on file     Attends meetings of clubs or organizations: Not on file     Relationship status: Not on file     Intimate partner violence:     Fear of current or ex partner: Not on file     Emotionally abused: Not on file     Physically abused: Not on file     Forced sexual activity: Not on file   Other Topics Concern     Not on file   Social History Narrative     Not on file     No Known Allergies     Current Outpatient Medications   Medication Sig Dispense Refill     acetaminophen (TYLENOL) 500 MG tablet Take 1,000 mg by mouth daily. 1h prior to dressing changes, and two times a day prn at least 4h between doses             miconazole (MICOTIN) 2 % cream Apply 1 application topically 2 (two) times a day. Apply to penile erosion       multivitamin with minerals tablet Take 1 tablet by mouth daily.       tamsulosin (FLOMAX) 0.4 mg cap Take 0.4 mg by mouth Daily after breakfast.       No current facility-administered medications for this visit.      REVIEW OF SYSTEMS:    Currently, no fever, chills, or rigors. Does not have any visual or hearing  problems. Denies any chest pain, headaches, palpitations, lightheadedness, dizziness, shortness of breath, or cough. Appetite is good. Denies any GERD symptoms. Denies any difficulty with swallowing, nausea, or vomiting.  Denies any abdominal pain, diarrhea or constipation. Urinary retention with suprapubic catheter. No insomnia. No active bleeding. No rash.     PHYSICAL EXAMINATION:  Vitals:    03/10/19 1311   BP: 120/62   Pulse: 86   Resp: 17   Temp: 96.6  F (35.9  C)   SpO2: 90%   Weight: 144 lb (65.3 kg)       GENERAL: Awake, Alert, oriented x3, not in any form of acute distress, answers questions appropriately, follows simple commands, conversant  HEENT: Head is normocephalic with normal hair distribution. No evidence of trauma. Ears: No acute purulent discharge. Eyes: Conjunctivae pink with no scleral jaundice. Nose: Normal mucosa and septum. NECK: Supple with no cervical or supraclavicular lymphadenopathy. Trachea is midline.   CHEST: No tenderness or deformity, no crepitus  LUNG: Clear to auscultation with good chest expansion. There are no crackles or wheezes, normal AP diameter.  BACK: No kyphosis of the thoracic spine. Symmetric, no curvature, ROM normal, no CVA tenderness, no spinal tenderness   CVS: There is good S1  S2, there are no murmurs, rubs, gallops, or heaves, rhythm is regular,  2+ pulses symmetric in all extremities.  ABDOMEN: Globular and soft, nontender to palpation, non distended, no masses, no organomegaly, good bowel sounds, no rebound or guarding, no peritoneal signs.   EXTREMITIES:  Full range of motion on both upper and lower extremities, there is no tenderness to palpation, no pedal edema, no cyanosis or clubbing, no calf tenderness.  Pulses equal in all extremities, normal cap refill, no joint swelling.  SKIN: Warm and dry, no erythema noted.  Skin color, texture, no rashes or lesions.  NEUROLOGICAL: The patient is oriented to person, place and time. Strength and sensation are  grossly intact. Face is symmetric.    LABS:  All labs reviewed in the nursing home record.    ASSESSMENT/PLAN:    1. Benign prostatic hyperplasia with urinary retention - s/p suprapubic catheter and continue Flomax   2. Suprapubic catheter (H) - due to penile erosion   3. Generalized weakness - Continue PT/OT   4. PAD (peripheral artery disease) (H) - Stable   5. FTT (failure to thrive) in adult - Consider Hospice as outpatient due to poor oral intake and weight loss        Electronically signed by:  John Oneil CNP    35 minutes TT of which 50% was spent in counseling and coordination of care of the above plan.    Time spent in interview and examination of patient, review of available records, and discussion with nursing staff. Continue care plan, efforts at therapy, and monitor nutritional status.

## 2021-06-24 NOTE — PROGRESS NOTES
[facility]    Facility:   [facility] [[id number]]   Code Status: DNR/DNI      CHIEF COMPLAINT/REASON FOR VISIT:  Chief Complaint   Patient presents with     Review Of Multiple Medical Conditions       HISTORY:      HPI: Ronnie is a 89 y.o. male who was admitted to the hospital due to frequent falls and failure to thrive.  He had progressive weakness and now required wheelchair.  He had BPH with urinary retention requiring chronic indwelling Bar catheter.  While he was hospitalized it was recommended that he have a suprapubic catheter placed since there was erosion of the penis from the indwelling Bar catheter.  This procedure was done.  It was determined that he was at risk for aspiration pneumonia and restrictions of diet were imposed.  At the same time it was determined that he is at risk for dehydration and he required IV fluids.  However he became fluid overloaded.  It was at this point that discussion of hospice consultation was done and this occurred, but there were signs of improvement of his condition with some clearing of confusion with the treatment of the UTI and appearance of some improvement of overall condition, such that his daughter wanted to try transitional care unit for strengthening and potential for long-term care placement knowing that hospice is always an option in the future.    Upon current review of systems, he is not having any new problems and feels that things are going well for him.  He is not having fevers or chills.  He does not have cough or shortness of breath.  He does not of chest pain or palpitations of the heart.  He does not abdominal pain or nausea.  Of note is that he has lost 5 pounds.    Past Medical History:   Diagnosis Date     Arthropathy, multiple sites      Benign non-nodular prostatic hyperplasia without lower urinary tract symptoms      Chronic indwelling Bar catheter      Depressive disorder      Dysphagia      FTT  (failure to thrive) in adult      Gout      Hearing loss      Onychomycosis      PAD (peripheral artery disease) (H)      Penile erosion     d/t rivera cath             Family History   Problem Relation Age of Onset     Stroke Mother      Lung cancer Sister      Stroke Brother      Brain cancer Sister      Social History     Socioeconomic History     Marital status:      Spouse name: Not on file     Number of children: Not on file     Years of education: Not on file     Highest education level: Not on file   Occupational History     Not on file   Social Needs     Financial resource strain: Not on file     Food insecurity:     Worry: Not on file     Inability: Not on file     Transportation needs:     Medical: Not on file     Non-medical: Not on file   Tobacco Use     Smoking status: Former Smoker     Packs/day: 1.00     Years: 10.00     Pack years: 10.00     Types: Cigarettes     Last attempt to quit: 5/10/1963     Years since quittin.8     Smokeless tobacco: Never Used   Substance and Sexual Activity     Alcohol use: Yes     Alcohol/week: 4.2 oz     Types: 7 Cans of beer per week     Comment: 1 beer per day     Drug use: Not on file     Sexual activity: Not on file   Lifestyle     Physical activity:     Days per week: Not on file     Minutes per session: Not on file     Stress: Not on file   Relationships     Social connections:     Talks on phone: Not on file     Gets together: Not on file     Attends [employer] service: Not on file     Active member of club or organization: Not on file     Attends meetings of clubs or organizations: Not on file     Relationship status: Not on file     Intimate partner violence:     Fear of current or ex partner: Not on file     Emotionally abused: Not on file     Physically abused: Not on file     Forced sexual activity: Not on file   Other Topics Concern     Not on file   Social History Narrative     Not on file         Review of Systems    .  Vitals:    19    BP: 103/59   Pulse: 86   Resp: 16   Temp: (!) 96.1  F (35.6  C)   SpO2: 93%   Weight: 139 lb 3.2 oz (63.1 kg)       Physical Exam   Constitutional: No distress.   HENT:   Nose: Nose normal.   Eyes: Right eye exhibits no discharge. Left eye exhibits no discharge.   Neck: No JVD present.   Cardiovascular: Normal heart sounds.   Pulmonary/Chest: Breath sounds normal. No respiratory distress.   Abdominal: Soft. Bowel sounds are normal. There is no tenderness.   Musculoskeletal: He exhibits no edema.   Neurological: He is alert.   Psychiatric: He has a normal mood and affect.   Nursing note and vitals reviewed.        LABS:   No new laboratory testing.    ASSESSMENT:      ICD-10-CM    1. Frequent falls R29.6    2. Benign prostatic hyperplasia with urinary retention N40.1     R33.8    3. Suprapubic catheter (H) Z93.59    4. Generalized weakness R53.1        PLAN:    Continue with current plans.      Electronically signed by: Bayron Hall MD

## 2021-06-25 NOTE — PROGRESS NOTES
[facility]    NAME:  Ronnie Stein             :  1924  MRN: [id number]  CODE STATUS:  DNR/DNI    FACILITY:  [facility] [[id number]]       ROOM:   814    CHIEF COMPLAINT/REASON FOR VISIT:  Chief Complaint   Patient presents with     Problem Visit     Generalized weakness     HISTORY OF PRESENT ILLNESS: Ronnie Stein is a 89 y.o. male with Peripheral artery disease and Chronic indwelling rivera catheter, history of recurrent falls, wheelchair bound since admission in 2018 who was admitted on 2019 for failure to thrive, dehydration/malnutrition and recurrent falls. He presented to ER for evaluation following a fall and dizziness. No LOC.     On admission, he was noted to have multiple wounds feet, elbow and failure to thrive with prealbumin 7. Speech therapist following, concerned for aspiration. Now on dysphagia diet which puts him at higher risk of dehydration. He was treated with IVF on admission for dehydration but developed hypoxemia due to volume overload. IVF stopped. Dr. Tam discussed with family that hospice might be a better option and hospice consulted. Hospice Liaison met with patient and daughter on  and felt he was not hospice appropriate at the time. Patient at high risk for aspiration and also at very high risk for dehydration and acute kidney injury given poor oral intake. He continues to have poor oral intake and losing weight since admission. Yesterday(), Hospice Liaison reviewed patient's chart and they now agree that he is appropriate for hospice. Discussed with patient's daughter  who thinks patient is clinically doing little better and his confusion resolved. She would like to continue current management for now and is not ready for hospice or comfort care at this time. Patient had been little confused early this week which was likely due to UTI. Currently on antibiotic for UTI, his confusion much improved and daughter  would like to see if he can get stronger again. She would like to pursue TCU or long term placement at this time but knows that hospice is an option if he fails or worsening.  Repeat CXR 2/14/19 showed improved finding. He continues to require some supplemental oxygen mostly with sleeping.       Patient was also seen by Urology for rivera related penile erosion, recommended suprapubic tube placement which was placed on 2/12/19 by IR. He was noted to have E. Coli UTI now on oral antibiotic.       ASSESSMENT and PLAN      Principal Problem:  Recurrent Falls  Failure to thrive   Dysphagia     Patient with history of recurrent falls, admitted with fall and dizziness. Suspected etiology include dehydration, malnutrition.     Continue therapies.     Continue dysphagia diet per speech therapies. Aspiration precautions    High risk for dehydration and acute kidney injury.     Ensure supplement (he does not like the taste)    Discussed with nursing to assist patient at all times with feeding. Please keep recording his oral intake and encourage PO intake    Wound care following for multiple wounds    Hospice liaison met with patient and daughter 2/12/2019 - felt not hospice appropriate at the time. Now with poor oral intake and weight loss since then, he is hospice appropriate per hospice liaison. Discussed with daughter yesterday (2/14/19) who is not ready for hospice or comfort care at this time. But she understands that it is an option if he fails and/or they change their mind.   -Consider palliative care consult     Penile erosion     Chronic indwelling Rivera catheter  E. 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 Will need to be changed every 4 wks    Continue Keflex to complete the course.   Active Problems:  Cough,  hypoxemia: fluid overload on CXR 2/12. Also at high risk for aspiration    IVF stopped 2/12 given CXR findings with pulmonary venous congestion. He was weaned off oxygen, but now back on 2L oxygen overnight 2/13-2/14.     Obtain CXR today.     Oxygen as needed.     Aspiration precautions    Bronchodilator as needed.     If worsened, consider CT chest for further evaluation   Benign non-nodular prostatic hyperplasia without lower urinary tract symptoms    On Flomax    Urology following, appreciated    ACP (advance care planning)    DNR.     Consider hospice as outpatient  Patient was stabilized and transferred to TCU for continued rehabilitation.    Today, patient is seen at the bedside.  His appetite is poor and he has some weight loss.  He continues to work with therapies.      Past Medical History:   Diagnosis Date     Arthropathy, multiple sites      Benign non-nodular prostatic hyperplasia without lower urinary tract symptoms      Chronic indwelling Rivera catheter      Depressive disorder      Dysphagia      FTT (failure to thrive) in adult      Gout      Hearing loss      Onychomycosis      PAD (peripheral artery disease) (H)      Penile erosion     d/t rivera cath     Past Surgical History:   Procedure Laterality Date     CERVICAL LAMINECTOMY       LUMBAR LAMINECTOMY       [place] REMOVE LENS MATERIAL PHACOFRAGMT  6/1/06, 6/15/06     Family History   Problem Relation Age of Onset     Stroke Mother      Lung cancer Sister      Stroke Brother      Brain cancer Sister      Social History     Socioeconomic History     Marital status:      Spouse name: Not on file     Number of children: Not on file     Years of education: Not on file     Highest education level: Not on file   Occupational History     Not on file   Social Needs     Financial resource strain: Not on file     Food insecurity:     Worry: Not on file     Inability: Not on file     Transportation needs:     Medical: Not on file     Non-medical: Not on  file   Tobacco Use     Smoking status: Former Smoker     Packs/day: 1.00     Years: 10.00     Pack years: 10.00     Types: Cigarettes     Last attempt to quit: 5/10/1963     Years since quittin.8     Smokeless tobacco: Never Used   Substance and Sexual Activity     Alcohol use: Yes     Alcohol/week: 4.2 oz     Types: 7 Cans of beer per week     Comment: 1 beer per day     Drug use: Not on file     Sexual activity: Not on file   Lifestyle     Physical activity:     Days per week: Not on file     Minutes per session: Not on file     Stress: Not on file   Relationships     Social connections:     Talks on phone: Not on file     Gets together: Not on file     Attends [employer] service: Not on file     Active member of club or organization: Not on file     Attends meetings of clubs or organizations: Not on file     Relationship status: Not on file     Intimate partner violence:     Fear of current or ex partner: Not on file     Emotionally abused: Not on file     Physically abused: Not on file     Forced sexual activity: Not on file   Other Topics Concern     Not on file   Social History Narrative     Not on file     No Known Allergies     Current Outpatient Medications   Medication Sig Dispense Refill     acetaminophen (TYLENOL) 500 MG tablet Take 1,000 mg by mouth daily. 1h prior to dressing changes, and two times a day prn at least 4h between doses             miconazole (MICOTIN) 2 % cream Apply 1 application topically 2 (two) times a day. Apply to penile erosion       multivitamin with minerals tablet Take 1 tablet by mouth daily.       tamsulosin (FLOMAX) 0.4 mg cap Take 0.4 mg by mouth Daily after breakfast.       No current facility-administered medications for this visit.      REVIEW OF SYSTEMS:    Currently, no fever, chills, or rigors. Does not have any visual or hearing problems. Denies any chest pain, headaches, palpitations, lightheadedness, dizziness, shortness of breath, or cough. Appetite is good.  Denies any GERD symptoms. Denies any difficulty with swallowing, nausea, or vomiting.  Denies any abdominal pain, diarrhea or constipation. Urinary retention with suprapubic catheter. No insomnia. No active bleeding. No rash.     PHYSICAL EXAMINATION:  Vitals:    03/14/19 2039   BP: 118/55   Pulse: 67   Resp: 16   Temp: 96.6  F (35.9  C)   SpO2: 96%   Weight: 141 lb 4.8 oz (64.1 kg)       GENERAL: Awake, Alert, oriented x3, not in any form of acute distress, answers questions appropriately, follows simple commands, conversant  HEENT: Head is normocephalic with normal hair distribution. No evidence of trauma. Ears: No acute purulent discharge. Eyes: Conjunctivae pink with no scleral jaundice. Nose: Normal mucosa and septum. NECK: Supple with no cervical or supraclavicular lymphadenopathy. Trachea is midline.   CHEST: No tenderness or deformity, no crepitus  LUNG: Clear to auscultation with good chest expansion. There are no crackles or wheezes, normal AP diameter.  BACK: No kyphosis of the thoracic spine. Symmetric, no curvature, ROM normal, no CVA tenderness, no spinal tenderness   CVS: There is good S1  S2, there are no murmurs, rubs, gallops, or heaves, rhythm is regular,  2+ pulses symmetric in all extremities.  ABDOMEN: Globular and soft, nontender to palpation, non distended, no masses, no organomegaly, good bowel sounds, no rebound or guarding, no peritoneal signs.   EXTREMITIES:  Full range of motion on both upper and lower extremities, there is no tenderness to palpation, no pedal edema, no cyanosis or clubbing, no calf tenderness.  Pulses equal in all extremities, normal cap refill, no joint swelling.  SKIN: Warm and dry, no erythema noted.  Skin color, texture, no rashes or lesions.  NEUROLOGICAL: The patient is oriented to person, place and time. Strength and sensation are grossly intact. Face is symmetric.    LABS:  All labs reviewed in the nursing home record.    ASSESSMENT/PLAN:    1. Benign prostatic  hyperplasia with urinary retention - Continue Flomax   2. Pain management - Continue Tylenol 1000 mg ed   3. Suprapubic catheter (H) - Due to urinary retention   4. Generalized weakness - Continue PT/OT                   Electronically signed by:  John Oneil CNP

## 2021-06-25 NOTE — PROGRESS NOTES
[facility]    NAME:  Ronnie Stein             :  1924  MRN: [id number]  CODE STATUS:  DNR/DNI    FACILITY:  [facility] [[id number]]       ROOM:   814    CHIEF COMPLAINT/REASON FOR VISIT:  Chief Complaint   Patient presents with     Problem Visit     Decreased ROM and Urinary retention     HISTORY OF PRESENT ILLNESS: Ronnie Stein is a 89 y.o. male with Peripheral artery disease and Chronic indwelling rivera catheter, history of recurrent falls, wheelchair bound since admission in 2018 who was admitted on 2019 for failure to thrive, dehydration/malnutrition and recurrent falls. He presented to ER for evaluation following a fall and dizziness. No LOC.     On admission, he was noted to have multiple wounds feet, elbow and failure to thrive with prealbumin 7. Speech therapist following, concerned for aspiration. Now on dysphagia diet which puts him at higher risk of dehydration. He was treated with IVF on admission for dehydration but developed hypoxemia due to volume overload. IVF stopped. Dr. Tam discussed with family that hospice might be a better option and hospice consulted. Hospice Liaison met with patient and daughter on  and felt he was not hospice appropriate at the time. Patient at high risk for aspiration and also at very high risk for dehydration and acute kidney injury given poor oral intake. He continues to have poor oral intake and losing weight since admission. Yesterday(), Hospice Liaison reviewed patient's chart and they now agree that he is appropriate for hospice. Discussed with patient's daughter  who thinks patient is clinically doing little better and his confusion resolved. She would like to continue current management for now and is not ready for hospice or comfort care at this time. Patient had been little confused early this week which was likely due to UTI. Currently on antibiotic for UTI, his confusion much  improved and daughter would like to see if he can get stronger again. She would like to pursue TCU or long term placement at this time but knows that hospice is an option if he fails or worsening.  Repeat CXR 2/14/19 showed improved finding. He continues to require some supplemental oxygen mostly with sleeping.       Patient was also seen by Urology for rivera related penile erosion, recommended suprapubic tube placement which was placed on 2/12/19 by IR. He was noted to have E. Coli UTI now on oral antibiotic.       ASSESSMENT and PLAN      Principal Problem:  Recurrent Falls  Failure to thrive   Dysphagia     Patient with history of recurrent falls, admitted with fall and dizziness. Suspected etiology include dehydration, malnutrition.     Continue therapies.     Continue dysphagia diet per speech therapies. Aspiration precautions    High risk for dehydration and acute kidney injury.     Ensure supplement (he does not like the taste)    Discussed with nursing to assist patient at all times with feeding. Please keep recording his oral intake and encourage PO intake    Wound care following for multiple wounds    Hospice liaison met with patient and daughter 2/12/2019 - felt not hospice appropriate at the time. Now with poor oral intake and weight loss since then, he is hospice appropriate per hospice liaison. Discussed with daughter yesterday (2/14/19) who is not ready for hospice or comfort care at this time. But she understands that it is an option if he fails and/or they change their mind.   -Consider palliative care consult     Penile erosion     Chronic indwelling Rivera catheter  E. 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 Will need to be changed every 4 wks    Continue Keflex to complete the course.   Active  Problems:  Cough, hypoxemia: fluid overload on CXR 2/12. Also at high risk for aspiration    IVF stopped 2/12 given CXR findings with pulmonary venous congestion. He was weaned off oxygen, but now back on 2L oxygen overnight 2/13-2/14.     Obtain CXR today.     Oxygen as needed.     Aspiration precautions    Bronchodilator as needed.     If worsened, consider CT chest for further evaluation   Benign non-nodular prostatic hyperplasia without lower urinary tract symptoms    On Flomax    Urology following, appreciated    ACP (advance care planning)    DNR.     Consider hospice as outpatient  Patient was stabilized and transferred to TCU for continued rehabilitation.    Today, patient is seen at the bedside.  Denies any uncontrolled pain.  Reports decreased ROM of bilateral shoulders.  He has bilateral lower extremity edema.  He continues to work with therapies.      Past Medical History:   Diagnosis Date     Arthropathy, multiple sites      Benign non-nodular prostatic hyperplasia without lower urinary tract symptoms      Chronic indwelling Rivear catheter      Depressive disorder      Dysphagia      FTT (failure to thrive) in adult      Gout      Hearing loss      Onychomycosis      PAD (peripheral artery disease) (H)      Penile erosion     d/t rivera cath     Past Surgical History:   Procedure Laterality Date     CERVICAL LAMINECTOMY       LUMBAR LAMINECTOMY       [place] REMOVE LENS MATERIAL PHACOFRAGMT  6/1/06, 6/15/06     Family History   Problem Relation Age of Onset     Stroke Mother      Lung cancer Sister      Stroke Brother      Brain cancer Sister      Social History     Socioeconomic History     Marital status:      Spouse name: Not on file     Number of children: Not on file     Years of education: Not on file     Highest education level: Not on file   Occupational History     Not on file   Social Needs     Financial resource strain: Not on file     Food insecurity:     Worry: Not on file     Inability:  Not on file     Transportation needs:     Medical: Not on file     Non-medical: Not on file   Tobacco Use     Smoking status: Former Smoker     Packs/day: 1.00     Years: 10.00     Pack years: 10.00     Types: Cigarettes     Last attempt to quit: 5/10/1963     Years since quittin.9     Smokeless tobacco: Never Used   Substance and Sexual Activity     Alcohol use: Yes     Alcohol/week: 4.2 oz     Types: 7 Cans of beer per week     Comment: 1 beer per day     Drug use: Not on file     Sexual activity: Not on file   Lifestyle     Physical activity:     Days per week: Not on file     Minutes per session: Not on file     Stress: Not on file   Relationships     Social connections:     Talks on phone: Not on file     Gets together: Not on file     Attends [employer] service: Not on file     Active member of club or organization: Not on file     Attends meetings of clubs or organizations: Not on file     Relationship status: Not on file     Intimate partner violence:     Fear of current or ex partner: Not on file     Emotionally abused: Not on file     Physically abused: Not on file     Forced sexual activity: Not on file   Other Topics Concern     Not on file   Social History Narrative     Not on file     No Known Allergies     Current Outpatient Medications   Medication Sig Dispense Refill     acetaminophen (TYLENOL) 500 MG tablet Take 1,000 mg by mouth daily. 1h prior to dressing changes, and two times a day prn at least 4h between doses             miconazole (MICOTIN) 2 % cream Apply 1 application topically 2 (two) times a day. Apply to penile erosion       multivitamin with minerals tablet Take 1 tablet by mouth daily.       tamsulosin (FLOMAX) 0.4 mg cap Take 0.4 mg by mouth Daily after breakfast.       No current facility-administered medications for this visit.      REVIEW OF SYSTEMS:    Currently, no fever, chills, or rigors. Does not have any visual or hearing problems. Denies any chest pain, headaches,  palpitations, lightheadedness, dizziness, shortness of breath, or cough. Appetite is good. Denies any GERD symptoms. Denies any difficulty with swallowing, nausea, or vomiting.  Denies any abdominal pain, diarrhea or constipation. Urinary retention with suprapubic catheter. No insomnia. No active bleeding. No rash.     PHYSICAL EXAMINATION:  Vitals:    03/21/19 1147   BP: 127/60   Pulse: 71   Resp: 16   Temp: 97.6  F (36.4  C)   SpO2: 96%   Weight: 141 lb 4.8 oz (64.1 kg)       GENERAL: Awake, Alert, oriented x3, not in any form of acute distress, answers questions appropriately, follows simple commands, conversant  HEENT: Head is normocephalic with normal hair distribution. No evidence of trauma. Ears: No acute purulent discharge. Eyes: Conjunctivae pink with no scleral jaundice. Nose: Normal mucosa and septum. NECK: Supple with no cervical or supraclavicular lymphadenopathy. Trachea is midline.   CHEST: No tenderness or deformity, no crepitus  LUNG: Clear to auscultation with good chest expansion. There are no crackles or wheezes, normal AP diameter.  BACK: No kyphosis of the thoracic spine. Symmetric, no curvature, ROM normal, no CVA tenderness, no spinal tenderness   CVS: There is good S1  S2, there are no murmurs, rubs, gallops, or heaves, rhythm is regular,  2+ pulses symmetric in all extremities.  ABDOMEN: Globular and soft, nontender to palpation, non distended, no masses, no organomegaly, good bowel sounds, no rebound or guarding, no peritoneal signs.   EXTREMITIES:  Decreased range of motion on both upper and lower extremities, there is no tenderness to palpation, bilateral lower extremity edema, no cyanosis or clubbing, no calf tenderness.  Pulses equal in all extremities, normal cap refill, no joint swelling.  SKIN: Warm and dry, no erythema noted.  Skin color, texture, no rashes or lesions.  NEUROLOGICAL: The patient is oriented to person, place and time. Strength and sensation are grossly intact. Face  is symmetric.    LABS:  All labs reviewed in the nursing home record.    ASSESSMENT/PLAN:    1. Generalized weakness - Wheelchair bound, continues to work with PT/OT   2. Frequent falls - No recent falls   3. Benign prostatic hyperplasia with urinary retention - s/p suprapubic catheter, continue Flomax   4. Suprapubic catheter (H) - See above                         Electronically signed by:  John Oneil CNP